# Patient Record
Sex: MALE | Race: BLACK OR AFRICAN AMERICAN | NOT HISPANIC OR LATINO | Employment: OTHER | URBAN - METROPOLITAN AREA
[De-identification: names, ages, dates, MRNs, and addresses within clinical notes are randomized per-mention and may not be internally consistent; named-entity substitution may affect disease eponyms.]

---

## 2019-11-26 ENCOUNTER — HOSPITAL ENCOUNTER (OUTPATIENT)
Dept: RADIOLOGY | Facility: MEDICAL CENTER | Age: 79
End: 2019-11-26
Attending: INTERNAL MEDICINE
Payer: COMMERCIAL

## 2019-11-26 DIAGNOSIS — D72.829 LEUKOCYTOSIS, UNSPECIFIED TYPE: ICD-10-CM

## 2019-11-26 DIAGNOSIS — M54.6 PAIN IN THORACIC SPINE: ICD-10-CM

## 2019-11-26 DIAGNOSIS — M54.50 LOW BACK PAIN, UNSPECIFIED BACK PAIN LATERALITY, UNSPECIFIED CHRONICITY, UNSPECIFIED WHETHER SCIATICA PRESENT: ICD-10-CM

## 2019-11-26 PROCEDURE — 72070 X-RAY EXAM THORAC SPINE 2VWS: CPT

## 2019-11-26 PROCEDURE — 76700 US EXAM ABDOM COMPLETE: CPT

## 2019-11-26 PROCEDURE — 72100 X-RAY EXAM L-S SPINE 2/3 VWS: CPT

## 2019-12-01 DIAGNOSIS — J32.9 SINUSITIS, UNSPECIFIED CHRONICITY, UNSPECIFIED LOCATION: ICD-10-CM

## 2019-12-01 RX ORDER — LEVOFLOXACIN 750 MG/1
750 TABLET, FILM COATED ORAL 2 TIMES DAILY
Qty: 14 TAB | Refills: 0 | Status: SHIPPED | OUTPATIENT
Start: 2019-12-01 | End: 2019-12-08

## 2021-01-15 DIAGNOSIS — Z23 NEED FOR VACCINATION: ICD-10-CM

## 2021-06-20 ENCOUNTER — HOSPITAL ENCOUNTER (INPATIENT)
Facility: MEDICAL CENTER | Age: 81
LOS: 4 days | DRG: 871 | End: 2021-06-25
Attending: EMERGENCY MEDICINE | Admitting: STUDENT IN AN ORGANIZED HEALTH CARE EDUCATION/TRAINING PROGRAM
Payer: MEDICARE

## 2021-06-20 ENCOUNTER — APPOINTMENT (OUTPATIENT)
Dept: RADIOLOGY | Facility: MEDICAL CENTER | Age: 81
DRG: 871 | End: 2021-06-20
Attending: EMERGENCY MEDICINE
Payer: MEDICARE

## 2021-06-20 DIAGNOSIS — B95.8 BACTEREMIA DUE TO STAPHYLOCOCCUS: ICD-10-CM

## 2021-06-20 DIAGNOSIS — R78.81 BACTEREMIA DUE TO STAPHYLOCOCCUS: ICD-10-CM

## 2021-06-20 DIAGNOSIS — I48.91 ATRIAL FIBRILLATION WITH RVR (HCC): ICD-10-CM

## 2021-06-20 DIAGNOSIS — A41.9 SEPSIS, DUE TO UNSPECIFIED ORGANISM, UNSPECIFIED WHETHER ACUTE ORGAN DYSFUNCTION PRESENT (HCC): ICD-10-CM

## 2021-06-20 DIAGNOSIS — E66.01 MORBIDLY OBESE (HCC): ICD-10-CM

## 2021-06-20 DIAGNOSIS — L03.116 CELLULITIS OF LEFT LOWER EXTREMITY: ICD-10-CM

## 2021-06-20 LAB
ALBUMIN SERPL BCP-MCNC: 3.7 G/DL (ref 3.2–4.9)
ALBUMIN/GLOB SERPL: 1 G/DL
ALP SERPL-CCNC: 89 U/L (ref 30–99)
ALT SERPL-CCNC: 19 U/L (ref 2–50)
ANION GAP SERPL CALC-SCNC: 10 MMOL/L (ref 7–16)
AST SERPL-CCNC: 24 U/L (ref 12–45)
BILIRUB SERPL-MCNC: 0.3 MG/DL (ref 0.1–1.5)
BUN SERPL-MCNC: 28 MG/DL (ref 8–22)
CALCIUM SERPL-MCNC: 9.9 MG/DL (ref 8.5–10.5)
CHLORIDE SERPL-SCNC: 105 MMOL/L (ref 96–112)
CO2 SERPL-SCNC: 23 MMOL/L (ref 20–33)
CREAT SERPL-MCNC: 1.85 MG/DL (ref 0.5–1.4)
EKG IMPRESSION: NORMAL
GLOBULIN SER CALC-MCNC: 3.6 G/DL (ref 1.9–3.5)
GLUCOSE SERPL-MCNC: 133 MG/DL (ref 65–99)
LACTATE BLD-SCNC: 3.4 MMOL/L (ref 0.5–2)
POTASSIUM SERPL-SCNC: 5.4 MMOL/L (ref 3.6–5.5)
PROT SERPL-MCNC: 7.3 G/DL (ref 6–8.2)
SODIUM SERPL-SCNC: 138 MMOL/L (ref 135–145)
TROPONIN T SERPL-MCNC: 89 NG/L (ref 6–19)

## 2021-06-20 PROCEDURE — 87040 BLOOD CULTURE FOR BACTERIA: CPT

## 2021-06-20 PROCEDURE — 0241U HCHG SARS-COV-2 COVID-19 NFCT DS RESP RNA 4 TRGT MIC: CPT

## 2021-06-20 PROCEDURE — 700105 HCHG RX REV CODE 258: Performed by: EMERGENCY MEDICINE

## 2021-06-20 PROCEDURE — 96365 THER/PROPH/DIAG IV INF INIT: CPT

## 2021-06-20 PROCEDURE — 83735 ASSAY OF MAGNESIUM: CPT

## 2021-06-20 PROCEDURE — 85520 HEPARIN ASSAY: CPT

## 2021-06-20 PROCEDURE — 87077 CULTURE AEROBIC IDENTIFY: CPT | Mod: 91

## 2021-06-20 PROCEDURE — 93005 ELECTROCARDIOGRAM TRACING: CPT | Performed by: EMERGENCY MEDICINE

## 2021-06-20 PROCEDURE — 84145 PROCALCITONIN (PCT): CPT

## 2021-06-20 PROCEDURE — 93005 ELECTROCARDIOGRAM TRACING: CPT

## 2021-06-20 PROCEDURE — 84439 ASSAY OF FREE THYROXINE: CPT

## 2021-06-20 PROCEDURE — 85379 FIBRIN DEGRADATION QUANT: CPT

## 2021-06-20 PROCEDURE — 71045 X-RAY EXAM CHEST 1 VIEW: CPT

## 2021-06-20 PROCEDURE — 85610 PROTHROMBIN TIME: CPT

## 2021-06-20 PROCEDURE — 84443 ASSAY THYROID STIM HORMONE: CPT

## 2021-06-20 PROCEDURE — 83880 ASSAY OF NATRIURETIC PEPTIDE: CPT

## 2021-06-20 PROCEDURE — 85730 THROMBOPLASTIN TIME PARTIAL: CPT

## 2021-06-20 PROCEDURE — 96375 TX/PRO/DX INJ NEW DRUG ADDON: CPT

## 2021-06-20 PROCEDURE — 85027 COMPLETE CBC AUTOMATED: CPT

## 2021-06-20 PROCEDURE — 85007 BL SMEAR W/DIFF WBC COUNT: CPT

## 2021-06-20 PROCEDURE — 99285 EMERGENCY DEPT VISIT HI MDM: CPT

## 2021-06-20 PROCEDURE — 87186 SC STD MICRODIL/AGAR DIL: CPT | Mod: 91

## 2021-06-20 PROCEDURE — 700111 HCHG RX REV CODE 636 W/ 250 OVERRIDE (IP): Performed by: EMERGENCY MEDICINE

## 2021-06-20 PROCEDURE — 84484 ASSAY OF TROPONIN QUANT: CPT

## 2021-06-20 PROCEDURE — 80053 COMPREHEN METABOLIC PANEL: CPT

## 2021-06-20 PROCEDURE — 83605 ASSAY OF LACTIC ACID: CPT

## 2021-06-20 RX ORDER — DILTIAZEM HYDROCHLORIDE 5 MG/ML
20 INJECTION INTRAVENOUS ONCE
Status: COMPLETED | OUTPATIENT
Start: 2021-06-20 | End: 2021-06-20

## 2021-06-20 RX ORDER — SODIUM CHLORIDE 9 MG/ML
1000 INJECTION, SOLUTION INTRAVENOUS ONCE
Status: COMPLETED | OUTPATIENT
Start: 2021-06-20 | End: 2021-06-21

## 2021-06-20 RX ADMIN — DILTIAZEM HYDROCHLORIDE 20 MG: 5 INJECTION INTRAVENOUS at 23:22

## 2021-06-20 RX ADMIN — SODIUM CHLORIDE 1000 ML: 9 INJECTION, SOLUTION INTRAVENOUS at 23:22

## 2021-06-21 ENCOUNTER — APPOINTMENT (OUTPATIENT)
Dept: RADIOLOGY | Facility: MEDICAL CENTER | Age: 81
DRG: 871 | End: 2021-06-21
Attending: STUDENT IN AN ORGANIZED HEALTH CARE EDUCATION/TRAINING PROGRAM
Payer: MEDICARE

## 2021-06-21 PROBLEM — E66.01 MORBIDLY OBESE (HCC): Status: ACTIVE | Noted: 2021-06-21

## 2021-06-21 PROBLEM — N17.9 ACUTE KIDNEY INJURY (HCC): Status: ACTIVE | Noted: 2021-06-21
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
## 2021-06-21 PROBLEM — A41.9 SEPSIS (HCC): Status: ACTIVE | Noted: 2021-06-21

## 2021-06-21 PROBLEM — R79.89 ELEVATED TROPONIN: Status: ACTIVE | Noted: 2021-06-21

## 2021-06-21 PROBLEM — A41.9 SEPSIS (HCC): Chronic | Status: ACTIVE | Noted: 2021-06-21

## 2021-06-21 PROBLEM — I48.91 A-FIB (HCC): Status: ACTIVE | Noted: 2021-06-21

## 2021-06-21 PROBLEM — J96.00 ACUTE RESPIRATORY FAILURE (HCC): Status: ACTIVE | Noted: 2021-06-21

## 2021-06-21 PROBLEM — I71.019 CHRONIC THORACIC AORTIC DISSECTION (HCC): Status: ACTIVE | Noted: 2021-06-21

## 2021-06-21 PROBLEM — L03.90 CELLULITIS: Status: ACTIVE | Noted: 2021-06-21

## 2021-06-21 PROBLEM — I21.A1 TYPE 2 MYOCARDIAL INFARCTION (HCC): Status: ACTIVE | Noted: 2021-06-21

## 2021-06-21 LAB
ANISOCYTOSIS BLD QL SMEAR: ABNORMAL
APPEARANCE UR: CLEAR
APTT PPP: 25.7 SEC (ref 24.7–36)
BACTERIA #/AREA URNS HPF: NEGATIVE /HPF
BASOPHILS # BLD AUTO: 0 % (ref 0–1.8)
BASOPHILS # BLD: 0 K/UL (ref 0–0.12)
BILIRUB UR QL STRIP.AUTO: NEGATIVE
COLOR UR: YELLOW
D DIMER PPP IA.FEU-MCNC: 4.95 UG/ML (FEU) (ref 0–0.5)
EKG IMPRESSION: NORMAL
EOSINOPHIL # BLD AUTO: 0 K/UL (ref 0–0.51)
EOSINOPHIL NFR BLD: 0 % (ref 0–6.9)
EPI CELLS #/AREA URNS HPF: NEGATIVE /HPF
ERYTHROCYTE [DISTWIDTH] IN BLOOD BY AUTOMATED COUNT: 50.5 FL (ref 35.9–50)
FLUAV RNA SPEC QL NAA+PROBE: NEGATIVE
FLUBV RNA SPEC QL NAA+PROBE: NEGATIVE
GLUCOSE UR STRIP.AUTO-MCNC: NEGATIVE MG/DL
HCT VFR BLD AUTO: 44.4 % (ref 42–52)
HGB BLD-MCNC: 14 G/DL (ref 14–18)
HYALINE CASTS #/AREA URNS LPF: ABNORMAL /LPF
INR PPP: 1.22 (ref 0.87–1.13)
KETONES UR STRIP.AUTO-MCNC: NEGATIVE MG/DL
LACTATE BLD-SCNC: 1.5 MMOL/L (ref 0.5–2)
LACTATE BLD-SCNC: 1.8 MMOL/L (ref 0.5–2)
LACTATE BLD-SCNC: 2.5 MMOL/L (ref 0.5–2)
LEUKOCYTE ESTERASE UR QL STRIP.AUTO: NEGATIVE
LYMPHOCYTES # BLD AUTO: 1.66 K/UL (ref 1–4.8)
LYMPHOCYTES NFR BLD: 6.3 % (ref 22–41)
MACROCYTES BLD QL SMEAR: ABNORMAL
MAGNESIUM SERPL-MCNC: 1.6 MG/DL (ref 1.5–2.5)
MANUAL DIFF BLD: NORMAL
MCH RBC QN AUTO: 29 PG (ref 27–33)
MCHC RBC AUTO-ENTMCNC: 31.5 G/DL (ref 33.7–35.3)
MCV RBC AUTO: 91.9 FL (ref 81.4–97.8)
MICRO URNS: ABNORMAL
MONOCYTES # BLD AUTO: 1.84 K/UL (ref 0–0.85)
MONOCYTES NFR BLD AUTO: 7 % (ref 0–13.4)
MORPHOLOGY BLD-IMP: NORMAL
NEUTROPHILS # BLD AUTO: 22.8 K/UL (ref 1.82–7.42)
NEUTROPHILS NFR BLD: 86.7 % (ref 44–72)
NITRITE UR QL STRIP.AUTO: NEGATIVE
NRBC # BLD AUTO: 0 K/UL
NRBC BLD-RTO: 0 /100 WBC
NT-PROBNP SERPL IA-MCNC: 253 PG/ML (ref 0–125)
PH UR STRIP.AUTO: 5 [PH] (ref 5–8)
PLATELET # BLD AUTO: 148 K/UL (ref 164–446)
PLATELET BLD QL SMEAR: NORMAL
PMV BLD AUTO: 9.8 FL (ref 9–12.9)
PROCALCITONIN SERPL-MCNC: 0.15 NG/ML
PROT UR QL STRIP: 100 MG/DL
PROTHROMBIN TIME: 15.1 SEC (ref 12–14.6)
RBC # BLD AUTO: 4.83 M/UL (ref 4.7–6.1)
RBC # URNS HPF: ABNORMAL /HPF
RBC BLD AUTO: PRESENT
RBC UR QL AUTO: ABNORMAL
RSV RNA SPEC QL NAA+PROBE: NEGATIVE
SARS-COV-2 RNA RESP QL NAA+PROBE: NOTDETECTED
SP GR UR STRIP.AUTO: 1.02
SPECIMEN SOURCE: NORMAL
T4 FREE SERPL-MCNC: 1.17 NG/DL (ref 0.93–1.7)
TROPONIN T SERPL-MCNC: 108 NG/L (ref 6–19)
TROPONIN T SERPL-MCNC: 117 NG/L (ref 6–19)
TROPONIN T SERPL-MCNC: 97 NG/L (ref 6–19)
TSH SERPL DL<=0.005 MIU/L-ACNC: 1.75 UIU/ML (ref 0.38–5.33)
UFH PPP CHRO-ACNC: <0.1 IU/ML
UROBILINOGEN UR STRIP.AUTO-MCNC: 0.2 MG/DL
WBC # BLD AUTO: 26.3 K/UL (ref 4.8–10.8)
WBC #/AREA URNS HPF: ABNORMAL /HPF

## 2021-06-21 PROCEDURE — 36415 COLL VENOUS BLD VENIPUNCTURE: CPT

## 2021-06-21 PROCEDURE — 93005 ELECTROCARDIOGRAM TRACING: CPT | Performed by: STUDENT IN AN ORGANIZED HEALTH CARE EDUCATION/TRAINING PROGRAM

## 2021-06-21 PROCEDURE — 700111 HCHG RX REV CODE 636 W/ 250 OVERRIDE (IP): Performed by: EMERGENCY MEDICINE

## 2021-06-21 PROCEDURE — A9270 NON-COVERED ITEM OR SERVICE: HCPCS | Performed by: STUDENT IN AN ORGANIZED HEALTH CARE EDUCATION/TRAINING PROGRAM

## 2021-06-21 PROCEDURE — 700105 HCHG RX REV CODE 258: Performed by: STUDENT IN AN ORGANIZED HEALTH CARE EDUCATION/TRAINING PROGRAM

## 2021-06-21 PROCEDURE — 99223 1ST HOSP IP/OBS HIGH 75: CPT | Performed by: STUDENT IN AN ORGANIZED HEALTH CARE EDUCATION/TRAINING PROGRAM

## 2021-06-21 PROCEDURE — 700102 HCHG RX REV CODE 250 W/ 637 OVERRIDE(OP): Performed by: STUDENT IN AN ORGANIZED HEALTH CARE EDUCATION/TRAINING PROGRAM

## 2021-06-21 PROCEDURE — 99231 SBSQ HOSP IP/OBS SF/LOW 25: CPT | Performed by: SURGERY

## 2021-06-21 PROCEDURE — 87040 BLOOD CULTURE FOR BACTERIA: CPT

## 2021-06-21 PROCEDURE — 307059 PAD,EAR PROTECTOR: Performed by: INTERNAL MEDICINE

## 2021-06-21 PROCEDURE — 83605 ASSAY OF LACTIC ACID: CPT

## 2021-06-21 PROCEDURE — 700105 HCHG RX REV CODE 258: Performed by: EMERGENCY MEDICINE

## 2021-06-21 PROCEDURE — 81001 URINALYSIS AUTO W/SCOPE: CPT

## 2021-06-21 PROCEDURE — 71275 CT ANGIOGRAPHY CHEST: CPT | Mod: ME

## 2021-06-21 PROCEDURE — 99223 1ST HOSP IP/OBS HIGH 75: CPT | Performed by: INTERNAL MEDICINE

## 2021-06-21 PROCEDURE — 84484 ASSAY OF TROPONIN QUANT: CPT

## 2021-06-21 PROCEDURE — 700101 HCHG RX REV CODE 250: Performed by: STUDENT IN AN ORGANIZED HEALTH CARE EDUCATION/TRAINING PROGRAM

## 2021-06-21 PROCEDURE — 770020 HCHG ROOM/CARE - TELE (206)

## 2021-06-21 PROCEDURE — 94640 AIRWAY INHALATION TREATMENT: CPT

## 2021-06-21 PROCEDURE — 700111 HCHG RX REV CODE 636 W/ 250 OVERRIDE (IP): Performed by: STUDENT IN AN ORGANIZED HEALTH CARE EDUCATION/TRAINING PROGRAM

## 2021-06-21 PROCEDURE — 700117 HCHG RX CONTRAST REV CODE 255: Performed by: STUDENT IN AN ORGANIZED HEALTH CARE EDUCATION/TRAINING PROGRAM

## 2021-06-21 RX ORDER — ONDANSETRON 2 MG/ML
4 INJECTION INTRAMUSCULAR; INTRAVENOUS EVERY 4 HOURS PRN
Status: DISCONTINUED | OUTPATIENT
Start: 2021-06-21 | End: 2021-06-25 | Stop reason: HOSPADM

## 2021-06-21 RX ORDER — BISACODYL 10 MG
10 SUPPOSITORY, RECTAL RECTAL
Status: DISCONTINUED | OUTPATIENT
Start: 2021-06-21 | End: 2021-06-25 | Stop reason: HOSPADM

## 2021-06-21 RX ORDER — ALLOPURINOL 100 MG/1
100 TABLET ORAL EVERY MORNING
COMMUNITY

## 2021-06-21 RX ORDER — MAGNESIUM SULFATE 1 G/100ML
1 INJECTION INTRAVENOUS ONCE
Status: COMPLETED | OUTPATIENT
Start: 2021-06-21 | End: 2021-06-21

## 2021-06-21 RX ORDER — POLYETHYLENE GLYCOL 3350 17 G/17G
1 POWDER, FOR SOLUTION ORAL
Status: DISCONTINUED | OUTPATIENT
Start: 2021-06-21 | End: 2021-06-25 | Stop reason: HOSPADM

## 2021-06-21 RX ORDER — SODIUM CHLORIDE, SODIUM LACTATE, POTASSIUM CHLORIDE, CALCIUM CHLORIDE 600; 310; 30; 20 MG/100ML; MG/100ML; MG/100ML; MG/100ML
INJECTION, SOLUTION INTRAVENOUS CONTINUOUS
Status: DISCONTINUED | OUTPATIENT
Start: 2021-06-21 | End: 2021-06-21

## 2021-06-21 RX ORDER — AMOXICILLIN 250 MG
2 CAPSULE ORAL 2 TIMES DAILY
Status: DISCONTINUED | OUTPATIENT
Start: 2021-06-21 | End: 2021-06-25 | Stop reason: HOSPADM

## 2021-06-21 RX ORDER — CARVEDILOL 6.25 MG/1
6.25 TABLET ORAL 2 TIMES DAILY WITH MEALS
Status: DISCONTINUED | OUTPATIENT
Start: 2021-06-21 | End: 2021-06-25 | Stop reason: HOSPADM

## 2021-06-21 RX ORDER — LISINOPRIL 20 MG/1
20 TABLET ORAL 2 TIMES DAILY
COMMUNITY

## 2021-06-21 RX ORDER — ONDANSETRON 4 MG/1
4 TABLET, ORALLY DISINTEGRATING ORAL EVERY 4 HOURS PRN
Status: DISCONTINUED | OUTPATIENT
Start: 2021-06-21 | End: 2021-06-25 | Stop reason: HOSPADM

## 2021-06-21 RX ORDER — ENALAPRILAT 1.25 MG/ML
1.25 INJECTION INTRAVENOUS EVERY 6 HOURS PRN
Status: DISCONTINUED | OUTPATIENT
Start: 2021-06-21 | End: 2021-06-25 | Stop reason: HOSPADM

## 2021-06-21 RX ORDER — ALLOPURINOL 300 MG/1
300 TABLET ORAL EVERY MORNING
COMMUNITY

## 2021-06-21 RX ORDER — ACETAMINOPHEN 325 MG/1
650 TABLET ORAL EVERY 6 HOURS PRN
Status: DISCONTINUED | OUTPATIENT
Start: 2021-06-21 | End: 2021-06-25 | Stop reason: HOSPADM

## 2021-06-21 RX ORDER — METOPROLOL SUCCINATE 25 MG/1
25 TABLET, EXTENDED RELEASE ORAL DAILY
Status: ON HOLD | COMMUNITY
End: 2021-06-25

## 2021-06-21 RX ORDER — METOPROLOL TARTRATE 1 MG/ML
5 INJECTION, SOLUTION INTRAVENOUS
Status: DISCONTINUED | OUTPATIENT
Start: 2021-06-21 | End: 2021-06-25 | Stop reason: HOSPADM

## 2021-06-21 RX ORDER — CEFAZOLIN SODIUM 2 G/100ML
2 INJECTION, SOLUTION INTRAVENOUS EVERY 8 HOURS
Status: DISCONTINUED | OUTPATIENT
Start: 2021-06-21 | End: 2021-06-25 | Stop reason: HOSPADM

## 2021-06-21 RX ORDER — METOPROLOL SUCCINATE 50 MG/1
50 TABLET, EXTENDED RELEASE ORAL
Status: DISCONTINUED | OUTPATIENT
Start: 2021-06-21 | End: 2021-06-21

## 2021-06-21 RX ORDER — IPRATROPIUM BROMIDE AND ALBUTEROL SULFATE 2.5; .5 MG/3ML; MG/3ML
3 SOLUTION RESPIRATORY (INHALATION)
Status: DISCONTINUED | OUTPATIENT
Start: 2021-06-21 | End: 2021-06-22

## 2021-06-21 RX ORDER — SODIUM CHLORIDE, SODIUM LACTATE, POTASSIUM CHLORIDE, AND CALCIUM CHLORIDE .6; .31; .03; .02 G/100ML; G/100ML; G/100ML; G/100ML
2000 INJECTION, SOLUTION INTRAVENOUS ONCE
Status: COMPLETED | OUTPATIENT
Start: 2021-06-21 | End: 2021-06-21

## 2021-06-21 RX ORDER — LABETALOL HYDROCHLORIDE 5 MG/ML
10 INJECTION, SOLUTION INTRAVENOUS EVERY 4 HOURS PRN
Status: DISCONTINUED | OUTPATIENT
Start: 2021-06-21 | End: 2021-06-25 | Stop reason: HOSPADM

## 2021-06-21 RX ADMIN — MAGNESIUM SULFATE 1 G: 1 INJECTION INTRAVENOUS at 05:41

## 2021-06-21 RX ADMIN — CEFAZOLIN SODIUM 2 G: 2 INJECTION, SOLUTION INTRAVENOUS at 14:11

## 2021-06-21 RX ADMIN — ASPIRIN 81 MG: 81 TABLET, COATED ORAL at 05:42

## 2021-06-21 RX ADMIN — IOHEXOL 100 ML: 350 INJECTION, SOLUTION INTRAVENOUS at 11:48

## 2021-06-21 RX ADMIN — SODIUM CHLORIDE, POTASSIUM CHLORIDE, SODIUM LACTATE AND CALCIUM CHLORIDE 2000 ML: 600; 310; 30; 20 INJECTION, SOLUTION INTRAVENOUS at 08:33

## 2021-06-21 RX ADMIN — IPRATROPIUM BROMIDE AND ALBUTEROL SULFATE 3 ML: .5; 2.5 SOLUTION RESPIRATORY (INHALATION) at 15:08

## 2021-06-21 RX ADMIN — CEFAZOLIN SODIUM 2 G: 2 INJECTION, SOLUTION INTRAVENOUS at 21:50

## 2021-06-21 RX ADMIN — CARVEDILOL 6.25 MG: 6.25 TABLET, FILM COATED ORAL at 14:11

## 2021-06-21 RX ADMIN — SODIUM CHLORIDE 3 G: 900 INJECTION INTRAVENOUS at 05:41

## 2021-06-21 RX ADMIN — METOPROLOL SUCCINATE 50 MG: 50 TABLET, EXTENDED RELEASE ORAL at 05:42

## 2021-06-21 RX ADMIN — ENOXAPARIN SODIUM 150 MG: 150 INJECTION SUBCUTANEOUS at 14:11

## 2021-06-21 RX ADMIN — ENOXAPARIN SODIUM 150 MG: 150 INJECTION SUBCUTANEOUS at 05:42

## 2021-06-21 RX ADMIN — SODIUM CHLORIDE 3 G: 900 INJECTION INTRAVENOUS at 00:54

## 2021-06-21 ASSESSMENT — ENCOUNTER SYMPTOMS: FALLS: 1

## 2021-06-21 ASSESSMENT — COGNITIVE AND FUNCTIONAL STATUS - GENERAL
HELP NEEDED FOR BATHING: A LOT
SUGGESTED CMS G CODE MODIFIER MOBILITY: CK
SUGGESTED CMS G CODE MODIFIER DAILY ACTIVITY: CK
MOVING FROM LYING ON BACK TO SITTING ON SIDE OF FLAT BED: A LITTLE
PERSONAL GROOMING: A LITTLE
DRESSING REGULAR LOWER BODY CLOTHING: A LOT
MOBILITY SCORE: 17
MOVING TO AND FROM BED TO CHAIR: A LITTLE
WALKING IN HOSPITAL ROOM: A LOT
STANDING UP FROM CHAIR USING ARMS: A LITTLE
CLIMB 3 TO 5 STEPS WITH RAILING: A LOT
DAILY ACTIVITIY SCORE: 17
DRESSING REGULAR UPPER BODY CLOTHING: A LITTLE
TOILETING: A LITTLE

## 2021-06-21 ASSESSMENT — FIBROSIS 4 INDEX: FIB4 SCORE: 2.9

## 2021-06-21 NOTE — ASSESSMENT & PLAN NOTE
New onset A. fib with RVR; now Afib without RVR.  Admitted with telemetry  Start carvedilol 6.25 mg twice daily  As needed enalaprilat 1.25  Continuer enoxaparin 150  Consider transition to rivaroxaban 20 pending improvement of ANGIE function  Cardiology consulted, appreciate recc's

## 2021-06-21 NOTE — ED PROVIDER NOTES
ER Provider Note     Scribed for Tyree Phan M.D. by Amanda Berger. 6/20/2021, 11:02 PM.    Primary Care Provider: Pcp Pt States None  Means of Arrival: Gabriela    History obtained from: Patient  History limited by: None     CHIEF COMPLAINT  Chief Complaint   Patient presents with    Fall     pt had fall going from kitchen to BR, landed forward on stomach onto tile floor. -head injury, -neck pain, pt states he was using walker and walker got too ahead of him.     Atrial Fibrillation     upon fire arrival pt was in afib rvr, fire gave 324 ASA. ems stated pt still in afib rvr 120-150 w/ PVCs. pt has no hx of afib or heart issues.           SERGIO Lewis is a 80 y.o. male who presents to the Emergency Department with a fall that happened earlier today.  The patient states that he was walking to the bathroom when his walker ended up too far out in front of him. When he tried to grab his walker, he fell forward. He denies chest pain, dizziness, neck pain, vision change, nausea, vomiting, foot pain or diarrhea. The patient's toes started bleeding after his fall. He has no history of atrial fibrillation and currently feels no palpitations. The patient adds that he is taking Lisinopril for his hypertension and is currently seeing . EMS noted a fever of 101.2 °F and he was given Tylenol. He has had a stress test in the past and the results were normal.     REVIEW OF SYSTEMS  Pertinent positives include atrial fibrillation and fall. Pertinent negatives include no chest pain, dizziness, neck pain, vision change, nausea, vomiting, foot pain or diarrhea.  All other systems reviewed and negative.     PAST MEDICAL HISTORY  Hypertension     SURGICAL HISTORY  patient denies any surgical history    SOCIAL HISTORY  Social History     Tobacco Use    Smoking status: None noted    Substance Use Topics    Alcohol use: None noted     Drug use: None noted       Social History     Substance and Sexual Activity   Drug Use  "None noted        FAMILY HISTORY  None noted     CURRENT MEDICATIONS  Home Medications       Reviewed by Frank Wilson Goes Out, PhT (Pharmacy Tech) on 06/21/21 at 0043  Med List Status: Complete     Medication Last Dose Status   allopurinol (ZYLOPRIM) 100 MG Tab 6/20/2021 Active   allopurinol (ZYLOPRIM) 300 MG Tab 6/20/2021 Active   lisinopril (PRINIVIL) 20 MG Tab 6/20/2021 Active   metoprolol SR (TOPROL XL) 25 MG TABLET SR 24 HR About 2 weeks ago Active                    ALLERGIES  No Known Allergies    PHYSICAL EXAM  VITAL SIGNS: /72   Pulse (!) 140   Temp 36.9 °C (98.4 °F) (Temporal)   Resp 18   Ht 1.803 m (5' 11\")   Wt (!) 154 kg (339 lb 8.1 oz)   SpO2 95%   BMI 47.35 kg/m²      Constitutional: Alert in no apparent distress.  HENT: No signs of trauma, Bilateral external ears normal, Nose normal.   Eyes: Pupils are equal and reactive, Conjunctiva normal, Non-icteric.   Neck: Normal range of motion, No tenderness, Supple, No stridor.   Lymphatic: No lymphadenopathy noted.   Cardiovascular: Tachycardic rate and irregularly irregular rhythm, no palpable thrill  Thorax & Lungs: No respiratory distress,  No chest tenderness.   Abdomen: Bowel sounds normal, Soft, No tenderness, No masses, No pulsatile masses. No peritoneal signs.  Skin: Warm, Dry, No erythema, No rash.   Back: No bony tenderness, No CVA tenderness.   Extremities: Abrasion to plantar aspect of right toe, Small abrasion to left knee and old surgical scars, Bilateral pedal edema left more than right, erythema to medial aspect of left ankle and warm to touch, Intact distal pulses, No tenderness, No cyanosis.  Musculoskeletal: Good range of motion in all major joints. No tenderness to palpation or major deformities noted.   Neurologic: Alert , Normal motor function, Normal sensory function, No focal deficits noted.   Psychiatric: Affect normal, Judgment normal, Mood normal.         DIAGNOSTIC STUDIES / PROCEDURES    EKG " Interpretation:  Interpreted by me    12 Lead EKG interpreted by me to show:   Irregularly irregular rhythm  Rate 148  Axis: Normal  Intervals: Normal  Normal T waves  Normal ST segments  My impression of this EKG: A fib and RVR        LABS  Labs Reviewed   CBC WITH DIFFERENTIAL - Abnormal; Notable for the following components:       Result Value    WBC 26.3 (*)     MCHC 31.5 (*)     RDW 50.5 (*)     Platelet Count 148 (*)     Neutrophils-Polys 86.70 (*)     Lymphocytes 6.30 (*)     Neutrophils (Absolute) 22.80 (*)     Monos (Absolute) 1.84 (*)     All other components within normal limits   COMP METABOLIC PANEL - Abnormal; Notable for the following components:    Glucose 133 (*)     Bun 28 (*)     Creatinine 1.85 (*)     Globulin 3.6 (*)     All other components within normal limits   TROPONIN - Abnormal; Notable for the following components:    Troponin T 89 (*)     All other components within normal limits   LACTIC ACID - Abnormal; Notable for the following components:    Lactic Acid 3.4 (*)     All other components within normal limits   D-DIMER - Abnormal; Notable for the following components:    D-Dimer Screen 4.95 (*)     All other components within normal limits   ESTIMATED GFR - Abnormal; Notable for the following components:    GFR If  43 (*)     GFR If Non  35 (*)     All other components within normal limits   PROBRAIN NATRIURETIC PEPTIDE, NT - Abnormal; Notable for the following components:    NT-proBNP 253 (*)     All other components within normal limits    Narrative:     If not done within the last 4 hours  Indicate which anticoagulants the patient is on:->UNKNOWN   PROTHROMBIN TIME - Abnormal; Notable for the following components:    PT 15.1 (*)     INR 1.22 (*)     All other components within normal limits    Narrative:     Indicate which anticoagulants the patient is on:->UNKNOWN   BLOOD CULTURE    Narrative:     Per Hospital Policy: Only change Specimen Src: to  "\"Line\" if  specified by physician order.   BLOOD CULTURE    Narrative:     Per Hospital Policy: Only change Specimen Src: to \"Line\" if  specified by physician order.   COV-2, FLU A/B, AND RSV BY PCR   PERIPHERAL SMEAR REVIEW   PLATELET ESTIMATE   MORPHOLOGY   DIFFERENTIAL MANUAL   MAGNESIUM    Narrative:     If not done within the last 4 hours  Indicate which anticoagulants the patient is on:->UNKNOWN   TSH    Narrative:     If not done within the last 4 hours  Indicate which anticoagulants the patient is on:->UNKNOWN   FREE THYROXINE    Narrative:     If not done within the last 4 hours  Indicate which anticoagulants the patient is on:->UNKNOWN   TROPONIN    Narrative:     Indicate which anticoagulants the patient is on:->UNKNOWN   PROCALCITONIN    Narrative:     If not done within the last 4 hours  Indicate which anticoagulants the patient is on:->UNKNOWN   APTT    Narrative:     Indicate which anticoagulants the patient is on:->UNKNOWN   HEPARIN XA (UNFRACTIONATED)    Narrative:     Indicate which anticoagulants the patient is on:->UNKNOWN   URINALYSIS,CULTURE IF INDICATED   LACTIC ACID   BLOOD CULTURE   BLOOD CULTURE   URINALYSIS     All labs reviewed by me.    RADIOLOGY  DX-CHEST-PORTABLE (1 VIEW)   Final Result      No acute cardiopulmonary abnormality identified.      EC-ECHOCARDIOGRAM COMPLETE W/O CONT    (Results Pending)   CT-CTA CHEST PULMONARY ARTERY W/ RECONS    (Results Pending)      The radiologist's interpretation of all radiological studies have been reviewed by me.    COURSE & MEDICAL DECISION MAKING  Pertinent Labs & Imaging studies reviewed. (See chart for details)    This is a 80 y.o. male that presents with appears to be infection in his left lower extremity as well as atrial fibrillation ventricular rate.  We will treat this aggressively.  Will evaluate the patient for sepsis as well as infection and acute coronary syndrome.  We will reassess after this..     11:02 PM - Patient seen and " examined at bedside. Ordered DX-chest-portable, Urinalysis culture, Blood culture x2, lactic acid, CBC with diff, CMP, troponin, NS infusion 1000 mL and EKG.  Patient will be medicated with Cardizem 20 mg for his symptoms.     11:23PM- Ordered D-dimer.     11:53 PM I discussed the patient's case and the above findings with Dr. Huffman (Hospitalist) who agreed to accept the patient.     12:17AM- Paged hospitalist.     12:34 AM I discussed the patient's case and the above findings with Dr. Woodard (Hospitalist) who agreed to evaluate the patient.       HYDRATION: Based on the patient's presentation of Dehydration the patient was given IV fluids. IV Hydration was used because oral hydration was not adequate alone. Upon recheck following hydration, the patient was improved.      After fluids as well as medication the patient's atrial fibrillation is improved.  Does have an elevated lactic acid.  He does have an elevated white count of 26.  Antibiotics will be given.  Fluids were given.  Troponin is elevated at 89.  His D-dimer is significantly elevated.  At this time a CT scan of the chest will be performed to be followed up by the admitting team.  We will admit the patient in guarded condition.  CRITICAL CARE  The very real possibilty of a deterioration of this patient's condition required the highest level of my preparedness for sudden, emergent intervention.  I provided critical care services, which included medication orders, frequent reevaluations of the patient's condition and response to treatment, ordering and reviewing test results, and discussing the case with various consultants.  The critical care time associated with the care of the patient was 35 minutes. Review chart for interventions. This time is exclusive of any other billable procedures.       DISPOSITION:  Patient will be hospitalized by Dr. Kiran in serious condition.      FINAL IMPRESSION  1. Atrial fibrillation with RVR (HCC)    2. Cellulitis of  left lower extremity    3. Sepsis, due to unspecified organism, unspecified whether acute organ dysfunction present (HCC)          Amanda CERDA (Scribe), am scribing for, and in the presence of, Tyree Phan M.D..    Electronically signed by: Amanda Berger (Scribdoreen), 6/20/2021    Tyree CERDA M.D. personally performed the services described in this documentation, as scribed by Amanda Berger in my presence, and it is both accurate and complete.     The note accurately reflects work and decisions made by me.  Tyree Phan M.D.  6/21/2021  1:24 AM

## 2021-06-21 NOTE — ED NOTES
Henrique from Lab called with critical result of troponin 108 at 0159. Critical lab result read back to Henrique.   Dr. Kiran notified of critical lab result at 0208.  Critical lab result read back by Dr. Kiran.

## 2021-06-21 NOTE — PROGRESS NOTES
Daily Progress Note:     Date of Service: 6/21/2021  Primary Team: [facility] Team   Attending: Aiden Weston D.O   Senior Resident: Dr. Zachery Adkins MD  Intern: Dr. ERIN Rapp MD  Contact:  [phone]    Chief Complaint:   Ground-level fall    Subjective:  Mr. Lewis is a 80-year-old man with past medical history remarkable for gout and hypertension who presented to the [facility] [place] [place] ED status post mechanical GLF, found to be septic secondary to LLE cellulitis, with A. fib with RVR, type II MI, ANGIE and acute hypoxic respiratory failure.  Patient reports that he has overextended his front wheel walker and fell onto his belly.  He did not hit his head, did not lose consciousness.      Consultants/Specialty:  Vascular surgery  Review of Systems:   Patient does not report any fevers or chills.  Denies chest pain, palpitation.  He does report shortness of breath, which is chronic.  No other complaints.  14 point review of system was conducted    Objective Data:   Physical Exam:   Vitals:   Temp:  [36.3 °C (97.4 °F)-36.9 °C (98.4 °F)] 36.6 °C (97.8 °F)  Pulse:  [] 98  Resp:  [18-19] 18  BP: (106-159)/(58-90) 135/86  SpO2:  [87 %-97 %] 94 %    General: Obese man laying in bed in no acute distress  HEENT: NC/AT.  PERRLA, EOMI.  External ear normal.  Nares patent, nonedematous nonerythematous.  Moist mucous membranes. Good dentition.  Trachea midline.   Neck: No JVP.  Carotid bruit could not be appreciated.  Nontender, nonnodular thyroid.  No anterior or posterior cervical, occipital, supraclavicular lymphadenopathy  Cardiovascular: PMI<2 cm at fifth costal space at midclavicular line.  No heaves appreciated.  No thrills.  Irregularly irregular tachycardia W/O M, R, G.  Pulmonary: Normal work of breathing.  Resonant to percussion.  CTAB, no wheezes, crackles, rhonchi heard in 10 lung fields  Abdomen: Obese, soft, nondistended.  Normoactive bowel sounds.  Nontender to percussion or palpation.  No  hepatosplenomegaly noted.  No fluid wave  Musculoskeletal: Normal tone and bulk.  Hemosiderin deposits noted bilaterally.  Edematous, erythematous left lower extremity, nonpurulent.  Skin: Warm and dry.  No rashes, bruises or lacerations noted  Neuro: Alert and oriented X4.  CN II-XII checked and intact.  5 out of 5 strength throughout.  DTR 2+ throughout.  FTN, HTS intact.  Sensation intact . negative Romberg.  Lymphatic: No edema or lymphadenopathy noted.  Psychiatric: Good mood congruent affect.  Thought form linear, content appropriate      Labs:   Recent Labs     06/20/21  2310   WBC 26.3*   RBC 4.83   HEMOGLOBIN 14.0   HEMATOCRIT 44.4   MCV 91.9   MCH 29.0   RDW 50.5*   PLATELETCT 148*   MPV 9.8   NEUTSPOLYS 86.70*   LYMPHOCYTES 6.30*   MONOCYTES 7.00   EOSINOPHILS 0.00   BASOPHILS 0.00   RBCMORPHOLO Present     Recent Labs     06/20/21  2310   SODIUM 138   POTASSIUM 5.4   CHLORIDE 105   CO2 23   GLUCOSE 133*   BUN 28*     Lactic acid:  3.4 on admission -> 1.5  TSH/T4: 1.75/1.17  BNP: 253  Pro-Manpreet 0.15    Imaging:   ECG remarkable for sinus tachycardia with inferior infarct of indeterminate age  CT-CTA: Remarkable for curvilinear low-attenuation area along the superior lateral aortic arch.  (Suspicious for intramural hematoma and focal dissection of indeterminate age).  Low density suggests protracted chronicity    Mr. Lewis is a 80-year-old man with past medical history remarkable for gout and hypertension who presented to the [facility] ED status post mechanical GLF, found to be septic secondary to LLE cellulitis, with A. 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    * Sepsis (HCC)- (present on admission)  Assessment & Plan  This is Sepsis Present on admission  SIRS criteria identified on my evaluation include: Fever, with temperature greater than 101 deg F  Source is likely left lower extremity  cellulitis  Sepsis protocol initiated  Fluid resuscitation ordered per protocol  IV antibiotics as appropriate for source of sepsis  While organ dysfunction may be noted elsewhere in this problem list or in the chart, degree of organ dysfunction does not meet CMS criteria for severe sepsis    S/P 30 ml/kg fluid resuscitation        Cellulitis- (present on admission)  Assessment & Plan  Cellulitis at LLE medial aspect to superior border of the malleolus  No obvious breakage of skin    -Discontinue Unasyn  -Start cefazolin 2 g every 8 hours for total of 5 days    A-fib (HCC)- (present on admission)  Assessment & Plan  New onset A. fib with RVR  Admitted with telemetry  Start carvedilol 6.25 mg twice daily  As needed enalaprilat 1.25  Continuer enoxaparin 150  Consider transition to rivaroxaban 20 pending improvement of ANGIE function  Cardiology consulted, appreciate [facility]'s      Chronic thoracic aortic dissection (HCC)  Assessment & Plan  Noted on CT-CTA at the superior lateral aortic arch  -Cardiovascular surgery consulted; do not recommend any acute interventions  -Follow-up as an outpatient upon discharge with cardiovascular surgery    Acute respiratory failure (HCC)- (present on admission)  Assessment & Plan  Requiring 2L nc on admission  RT  duonebs  Wheezing on auscultation   BNP ordered to assess  CT-CTA without evidence of PE      Morbidly obese (HCC)- (present on admission)  Assessment & Plan  BMI 47  OT/PT        Acute kidney injury (HCC)- (present on admission)  Assessment & Plan  Possibly acute on chronic kidney disease with current GFR of 35, BUN/creatinine 28/1.85  IV fluids and   Continue to monitor    Fall- (present on admission)  Assessment & Plan  Probable falls mechanical without loss of consciousness or injuries  Fall precautions in place  OT/PT to evaluate and treat    Type 2 myocardial infarction (HCC)- (present on admission)  Assessment & Plan  Serial troponins -472-97, likely secondary to  demand in the setting of A. fib with RVR  Repeated ECGs without signs of ischemia    Echo and stress test pending

## 2021-06-21 NOTE — ASSESSMENT & PLAN NOTE
Requiring 2L nc on admission  RT  duonebs  Wheezing on auscultation   BNP ordered to assess  CT-CTA without evidence of PE

## 2021-06-21 NOTE — ASSESSMENT & PLAN NOTE
Cellulitis at LLE medial aspect to superior border of the malleolus  No obvious breakage of skin    -Unasyn discontinued 6/21  -Cont cefazolin 2 g every 8 hours for total of 5 days

## 2021-06-21 NOTE — ASSESSMENT & PLAN NOTE
This is Sepsis Present on admission  SIRS criteria identified on my evaluation include: Fever, with temperature greater than 101 deg F  Source is likely left lower extremity cellulitis  Sepsis protocol initiated  Fluid resuscitation ordered per protocol  IV antibiotics as appropriate for source of sepsis  While organ dysfunction may be noted elsewhere in this problem list or in the chart, degree of organ dysfunction does not meet CMS criteria for severe sepsis    S/P 30 ml/kg fluid resuscitation

## 2021-06-21 NOTE — ED NOTES
Pt transferring to T711/01 via gurney on cardiac monitor w/ RN. Pt A&Ox4, 2L o2 nc, x1-2 assist. Pt belongings w/i possession. NAD noted.

## 2021-06-21 NOTE — CONSULTS
ACUTE CARE VASCULAR SERVICE  CONSULT NOTE      Date: 6/21/2021    Referring Provider: Aiden Hernandez DO    Consulting Physician: Hernandez Bello M.D. [facility]    -------------------------------------------------------------------------------------------------    Reason for consultation:  Aortic Abnormality    HPI:  This is a 80 y.o. male who is presenting with SOB and CTA for PE incidentally showed what appears to be a focal dissection of the distal aortic arch. Based on the CT images it appears to be chronic, thrombosed, and only involving a short segment of the aorta and does not extend down to mid thoracic aorta or beyond.  Patient is alert, conversant, and denies any upper back pain or chest pain at present or in the past. He has no specific complaints at this time. No abdominal pain, no leg pain    Past Medical History:   Diagnosis Date   • Hypertension        History reviewed. No pertinent surgical history.    Current Facility-Administered Medications   Medication Dose Route Frequency Provider Last Rate Last Admin   • senna-docusate (PERICOLACE or SENOKOT S) 8.6-50 MG per tablet 2 tablet  2 tablet Oral BID Cari Kiran M.D.        And   • polyethylene glycol/lytes (MIRALAX) PACKET 1 Packet  1 Packet Oral QDAY PRN Cari Kiran M.D.        And   • magnesium hydroxide (MILK OF MAGNESIA) suspension 30 mL  30 mL Oral QDAY PRN Cari Kiran M.D.        And   • bisacodyl (DULCOLAX) suppository 10 mg  10 mg Rectal QDAY PRN Cari Kiran M.D.       • acetaminophen (Tylenol) tablet 650 mg  650 mg Oral Q6HRS PRN Cari Kiran M.D.       • enalaprilat (VASOTEC) injection 1.25 mg  1.25 mg Intravenous Q6HRS PRN Cari Kiran M.D.       • labetalol (NORMODYNE/TRANDATE) injection 10 mg  10 mg Intravenous Q4HRS PRN Cari Kiran M.D.       • enoxaparin (LOVENOX) injection 150 mg  1 mg/kg Subcutaneous Q12HRS Cari Kiran M.D.   150 mg at 06/21/21 0542   • aspirin EC  (ECOTRIN) tablet 81 mg  81 mg Oral DAILY Cari Kiran M.D.   81 mg at 06/21/21 0542   • ondansetron (ZOFRAN) syringe/vial injection 4 mg  4 mg Intravenous Q4HRS PRN Cari Kiran M.D.       • ondansetron (ZOFRAN ODT) dispertab 4 mg  4 mg Oral Q4HRS PRN Cari Kiran M.D.       • ipratropium-albuterol (DUONEB) nebulizer solution  3 mL Nebulization Q4HRS (RT) Cari Kiran M.D.       • Metoprolol Tartrate (LOPRESSOR) injection 5 mg  5 mg Intravenous Q5 MIN PRN Cari Kiran M.D.       • ceFAZolin in dextrose (ANCEF) IVPB premix 2 g  2 g Intravenous Q8HRS Margie Rapp M.D.       • carvedilol (COREG) tablet 6.25 mg  6.25 mg Oral BID WITH MEALS Margie Rapp M.D.           Social History     Socioeconomic History   • Marital status:      Spouse name: Not on file   • Number of children: Not on file   • Years of education: Not on file   • Highest education level: Not on file   Occupational History   • Not on file   Tobacco Use   • Smoking status: Never Smoker   • Smokeless tobacco: Never Used   Vaping Use   • Vaping Use: Never used   Substance and Sexual Activity   • Alcohol use: Never   • Drug use: Never   • Sexual activity: Not on file   Other Topics Concern   • Not on file   Social History Narrative   • Not on file     Social Determinants of Health     Financial Resource Strain:    • Difficulty of Paying Living Expenses:    Food Insecurity:    • Worried About Running Out of Food in the Last Year:    • Ran Out of Food in the Last Year:    Transportation Needs:    • Lack of Transportation (Medical):    • Lack of Transportation (Non-Medical):    Physical Activity:    • Days of Exercise per Week:    • Minutes of Exercise per Session:    Stress:    • Feeling of Stress :    Social Connections:    • Frequency of Communication with Friends and Family:    • Frequency of Social Gatherings with Friends and Family:    • Attends Jewish Services:    • Active Member of Clubs or Organizations:    •  "Attends Club or Organization Meetings:    • Marital Status:    Intimate Partner Violence:    • Fear of Current or Ex-Partner:    • Emotionally Abused:    • Physically Abused:    • Sexually Abused:        History reviewed. No pertinent family history.    Allergies:  Patient has no known allergies.    Review of Systems:  Constitutional: Negative for fever, chills, weight loss,   HENT:   Negative for hearing loss or tinnitus    Eyes:    Negative for blurred vision, double vision, or loss of vision  Respiratory:  Mild dyspnea on admission, no orthopnea   Cardiac:  Negative for chest pain or palpitations or orthopnea  Vascular:  Negative for claudication or rest pain   Gastrointestinal: Negative for nausea, vomiting, or abdominal pain     Negative for hematochezia or melena   Genitourinary: Negative for dysuria, frequency, or hematuria   Musculoskeletal: Negative for myalgias, back pain, or joint pain  Skin:   Negative for itching or rash  Neurological:  Negative for dizziness, headaches, or tremors     Negative for speech disturbance     Negative for extremity weakness or paresthesias  Endo/Heme:  Negative for easy bruising or bleeding  Psychiatric:  Negative for depression, suicidal ideas, or hallucinations    Physical Exam:  /86   Pulse 98   Temp 36.6 °C (97.8 °F) (Temporal)   Resp 19   Ht 1.803 m (5' 11\")   Wt (!) 149 kg (327 lb 9.7 oz)   SpO2 94%     Constitutional: Alert, oriented, no acute distress  HEENT:  Normocephalic and atraumatic, EOMI  Neck:   Supple, no JVD, no bruits  Cardiovascular: Regular rate and rhythm, no murmurs  Pulmonary:  Good air entry bilaterally, no wheezing   Abdominal:  Soft, non-tender, non-distended     Aortic impulse not widened  Musculoskeletal: No edema, no tenderness  Neurological:  CN II-XII grossly intact, no focal deficits  Skin:   Skin is warm and dry. No rash noted.  Psychiatric:  Normal mood and affect.  Vascular:  Extremities warm and well perfused with strong " palpable DP pulses bilaterally      Labs:  Recent Labs     06/20/21 2310   WBC 26.3*   RBC 4.83   HEMOGLOBIN 14.0   HEMATOCRIT 44.4   MCV 91.9   MCH 29.0   MCHC 31.5*   RDW 50.5*   PLATELETCT 148*   MPV 9.8     Recent Labs     06/20/21 2310   SODIUM 138   POTASSIUM 5.4   CHLORIDE 105   CO2 23   GLUCOSE 133*   BUN 28*   CREATININE 1.85*   CALCIUM 9.9     Recent Labs     06/20/21 2350   APTT 25.7   INR 1.22*     Recent Labs     06/20/21 2310 06/20/21 2350   ASTSGOT 24  --    ALTSGPT 19  --    TBILIRUBIN 0.3  --    ALKPHOSPHAT 89  --    GLOBULIN 3.6*  --    INR  --  1.22*       Radiology:  CT Chest PE protocol:  1.  There is no CT evidence of acute pulmonary embolism.  2.  Curvilinear low-attenuation area along the superior lateral aortic arch suspicious for intramural hematoma and focal dissection, age indeterminate. The low density suggests this may be old blood.  3.  There is evidence of previous granulomatous inflammatory process.    Assessment/Plan:  -  Hypertension  -  Chronic aortic dissection    Aortic findings are chronic and non-concerning at this time. No additional imaging or intervention needed at this time.    Patient can follow-up with me as an outpatient for routine annual surveillance imaging.      Hernandez Bello MD  [facility] (General and Vascular Surgery)  Cell: [phone] (text or call is fine, if you don't reach me please try my office)  Office: [phone]    6/21/2021    2:08 PM  ___________________________________________________________________  Patient:David Lewis   MRN:[id number]   CSN:[id number]

## 2021-06-21 NOTE — ASSESSMENT & PLAN NOTE
Noted on CT-CTA at the superior lateral aortic arch  -Cardiovascular surgery consulted; do not recommend any acute interventions  -Follow-up as an outpatient upon discharge with cardiovascular surgery  -Blood pressure control, goals less than 130/80

## 2021-06-21 NOTE — PROGRESS NOTES
Assumed care of PT A&O 4. Pt resting in bed with no signs of labored breathing. On 2L NC. Tele monitor in place, cardiac rhythm being monitored. Call light within reach, bed in lowest position, upper bed rails up. Pt was updated on plan of care for the day. Will continue to monitor.

## 2021-06-21 NOTE — ASSESSMENT & PLAN NOTE
Probable falls mechanical without loss of consciousness or injuries  Fall precautions in place  OT/PT to evaluate and treat

## 2021-06-21 NOTE — ED TRIAGE NOTES
"Chief Complaint   Patient presents with   • Fall     pt had fall going from kitchen to BR, landed forward on stomach onto tile floor. -head injury, -neck pain, pt states he was using walker and walker got too ahead of him.    • Atrial Fibrillation     upon fire arrival pt was in afib rvr, fire gave 324 ASA. ems stated pt still in afib rvr 120-150 w/ PVCs. pt has no hx of afib or heart issues.        BIB EMS to R1, pt on monitor and in gown, labs drawn and sent. Pt consists of: above complaint, pt A&Ox4, denies dizziness/lightheadedness or CP. Denies pain. Pt has L ankle swollen, red, and tender; states it has been like that for a couple days.    Medications given en route:Fire gave 324 ASA, EMS gave 1g APAP for temp of 101.2f.     /72   Pulse (!) 140   Temp 36.9 °C (98.4 °F) (Temporal)   Resp 18   Ht 1.803 m (5' 11\")   Wt (!) 154 kg (339 lb 8.1 oz)   SpO2 95%   BMI 47.35 kg/m²   "

## 2021-06-21 NOTE — ED NOTES
Med Rec completed: per patient at bedside with family   Preferred Pharmacy: [id number]  Allergies:  No Known Allergies    No ORAL antibiotics in last 14 days    Home Medications:  Medication Sig Comments   • allopurinol (ZYLOPRIM) 100 MG Tab Take 100 mg by mouth every morning.    Takes 300 mg capsule and 100 mg capsule to equal 400 mg every morning   • allopurinol (ZYLOPRIM) 300 MG Tab Take 300 mg by mouth every morning.  Takes 300 mg capsule and 100 mg capsule to equal 400 mg every morning   • lisinopril (PRINIVIL) 20 MG Tab Take 20 mg by mouth 2 times a day.    • metoprolol SR (TOPROL XL) 25 MG TABLET SR 24 HR Take 25 mg by mouth every day. Reports is prescribed daily but has only been taking occasionally because he forgets to take because they told him he cannot take the same time as his other medications. Last dose est. 2 weeks ago.

## 2021-06-21 NOTE — ASSESSMENT & PLAN NOTE
Serial troponins -083-97, likely secondary to demand in the setting of A. fib with RVR  Repeated ECGs without signs of ischemia    Echocardiograms WNL  Stress test WNL

## 2021-06-21 NOTE — ASSESSMENT & PLAN NOTE
Possibly acute on chronic kidney disease with current GFR of 35, BUN/creatinine 28/1.85  IV fluids and   Continue to monitor

## 2021-06-21 NOTE — CONSULTS
Cardiology Initial Consult Note    Date of note:    6/21/2021      Consulting Physician: JORGE Cabrera*    Name:   David Lewis     YOB: 1940  Age:   80 y.o.  male   MRN:   [id number]      Reason for Consultation: Elevated troponin    HPI:  David Lewis is a 80 y.o. male with a history of hypertension who presented to the hospital after a mechanical fall.  On presentation, he was found to be in new onset Afib with RVR and elevated troponin for which cardiology is consulted.    Patient states that prior to his presentation to the hospital he was able to perform his ADLs without any significant chest pain, pressure, palpitations, lightheadedness or dizziness.  He has been wheelchair-bound for the past 3 years 2/2 knee problems.  Reports exertional dyspnea with activities due to deconditioning and morbid obesity.  Also noted bilateral lower extremity swelling yesterday which he did not notice prior.  Patient remains chest pain-free while in the hospital.    For new onset A. fib with RVR, patient denies any palpitations.      ROS  All others reviewed and negative except for pertinent positives mentioned in HPI.      Past Medical History:   Diagnosis Date   • Hypertension        History reviewed. No pertinent surgical history.      Medications Prior to Admission   Medication Sig Dispense Refill Last Dose   • allopurinol (ZYLOPRIM) 100 MG Tab Take 100 mg by mouth every morning. Takes 300 mg capsule and 100 mg capsule to equal 400 mg every morning   6/20/2021 at 1200   • allopurinol (ZYLOPRIM) 300 MG Tab Take 300 mg by mouth every morning. Takes 300 mg capsule and 100 mg capsule to equal 400 mg every morning   6/20/2021 at 1200   • lisinopril (PRINIVIL) 20 MG Tab Take 20 mg by mouth 2 times a day.   6/20/2021 at 1200   • metoprolol SR (TOPROL XL) 25 MG TABLET SR 24 HR Take 25 mg by mouth every day.     About 2 weeks ago at [facility]      Current Facility-Administered Medications   Medication Dose Route  Frequency Provider Last Rate Last Admin   • senna-docusate (PERICOLACE or SENOKOT S) 8.6-50 MG per tablet 2 tablet  2 tablet Oral BID Cari Kiran M.D.        And   • polyethylene glycol/lytes (MIRALAX) PACKET 1 Packet  1 Packet Oral QDAY PRN Cari Kiran M.D.        And   • magnesium hydroxide (MILK OF MAGNESIA) suspension 30 mL  30 mL Oral QDAY PRN Cari Kiran M.D.        And   • bisacodyl (DULCOLAX) suppository 10 mg  10 mg Rectal QDAY PRN Cari Kiran M.D.       • acetaminophen (Tylenol) tablet 650 mg  650 mg Oral Q6HRS PRN Cari Kiran M.D.       • enalaprilat (VASOTEC) injection 1.25 mg  1.25 mg Intravenous Q6HRS PRN Cari Kiran M.D.       • labetalol (NORMODYNE/TRANDATE) injection 10 mg  10 mg Intravenous Q4HRS PRN Cari Kiran M.D.       • metoprolol SR (TOPROL XL) tablet 50 mg  50 mg Oral Q DAY Cari Kiran M.D.   50 mg at 06/21/21 0542   • enoxaparin (LOVENOX) injection 150 mg  1 mg/kg Subcutaneous Q12HRS Cari Kiran M.D.   150 mg at 06/21/21 0542   • aspirin EC (ECOTRIN) tablet 81 mg  81 mg Oral DAILY Cari Kiran M.D.   81 mg at 06/21/21 0542   • ondansetron (ZOFRAN) syringe/vial injection 4 mg  4 mg Intravenous Q4HRS PRN Cari Kiran M.D.       • ondansetron (ZOFRAN ODT) dispertab 4 mg  4 mg Oral Q4HRS PRN Cari Kiran M.D.       • ampicillin/sulbactam (UNASYN) 3 g in  mL IVPB  3 g Intravenous Q6HR Cari Kiran M.D.   Stopped at 06/21/21 0611   • ipratropium-albuterol (DUONEB) nebulizer solution  3 mL Nebulization Q4HRS (RT) Cari Kiran M.D.       • Metoprolol Tartrate (LOPRESSOR) injection 5 mg  5 mg Intravenous Q5 MIN PRN Cari Kiran M.D.           No Known Allergies    History reviewed. No pertinent family history.    Social History     Socioeconomic History   • Marital status:      Spouse name: Not on file   • Number of children: Not on file   • Years of education: Not on file   • Highest education level:  "Not on file   Occupational History   • Not on file   Tobacco Use   • Smoking status: Never Smoker   • Smokeless tobacco: Never Used   Vaping Use   • Vaping Use: Never used   Substance and Sexual Activity   • Alcohol use: Never   • Drug use: Never   • Sexual activity: Not on file   Other Topics Concern   • Not on file   Social History Narrative   • Not on file     Social Determinants of Health     Financial Resource Strain:    • Difficulty of Paying Living Expenses:    Food Insecurity:    • Worried About Running Out of Food in the Last Year:    • Ran Out of Food in the Last Year:    Transportation Needs:    • Lack of Transportation (Medical):    • Lack of Transportation (Non-Medical):    Physical Activity:    • Days of Exercise per Week:    • Minutes of Exercise per Session:    Stress:    • Feeling of Stress :    Social Connections:    • Frequency of Communication with Friends and Family:    • Frequency of Social Gatherings with Friends and Family:    • Attends Christian Services:    • Active Member of Clubs or Organizations:    • Attends Club or Organization Meetings:    • Marital Status:    Intimate Partner Violence:    • Fear of Current or Ex-Partner:    • Emotionally Abused:    • Physically Abused:    • Sexually Abused:          Intake/Output Summary (Last 24 hours) at 6/21/2021 0917  Last data filed at 6/21/2021 0847  Gross per 24 hour   Intake 120 ml   Output 250 ml   Net -130 ml        Physical ExamBody mass index is 47.35 kg/m²./65   Pulse 82   Temp 36.3 °C (97.4 °F) (Temporal)   Resp 18   Ht 1.803 m (5' 11\")   Wt (!) 154 kg (339 lb 8.1 oz)   SpO2 95% Vitals:    06/21/21 0245 06/21/21 0300 06/21/21 0523 06/21/21 0847   BP: 148/65 159/90 122/71 120/65   Pulse: 83 (!) 112 86 82   Resp:   19 18   Temp:   36.6 °C (97.8 °F) 36.3 °C (97.4 °F)   TempSrc:   Temporal Temporal   SpO2: 88% 97% 96% 95%   Weight:       Height:         Oxygen Therapy: Pulse Oximetry: 95 %, O2 (LPM): 3, O2 Delivery Device: None " - Room Air    General: Well appearing and in no apparent distress  Eyes: nl conjunctiva  ENT: OP clear  Neck: JVP not elevated,  no carotid bruits  Lungs: normal respiratory effort, CTAB  Heart: irregular rhythm, no murmurs, no rubs or gallops, mild pitting edema bilateral lower extremities. No LV/RV heave on cardiac palpatation. 2+ bilateral radial pulses.  2+ bilateral dp pulses.   Abdomen: soft, non tender, non distended, no masses, normal bowel sounds.  No HSM.  Extremities/MSK: no clubbing, no cyanosis  Neurological: No focal sensory deficits  Psychiatric: Appropriate affect, A/O x 3  Skin: Warm extremities      Labs (personally reviewed and notable for): Lab Results   Component Value Date/Time    SODIUM 138 06/20/2021 11:10 PM    POTASSIUM 5.4 06/20/2021 11:10 PM    CHLORIDE 105 06/20/2021 11:10 PM    CO2 23 06/20/2021 11:10 PM    GLUCOSE 133 (H) 06/20/2021 11:10 PM    BUN 28 (H) 06/20/2021 11:10 PM    CREATININE 1.85 (H) 06/20/2021 11:10 PM    Lab Results   Component Value Date/Time    WBC 26.3 (H) 06/20/2021 11:10 PM    RBC 4.83 06/20/2021 11:10 PM    HEMOGLOBIN 14.0 06/20/2021 11:10 PM    HEMATOCRIT 44.4 06/20/2021 11:10 PM    MCV 91.9 06/20/2021 11:10 PM    MCH 29.0 06/20/2021 11:10 PM    MCHC 31.5 (L) 06/20/2021 11:10 PM    MPV 9.8 06/20/2021 11:10 PM    NEUTSPOLYS 86.70 (H) 06/20/2021 11:10 PM    LYMPHOCYTES 6.30 (L) 06/20/2021 11:10 PM    MONOCYTES 7.00 06/20/2021 11:10 PM    EOSINOPHILS 0.00 06/20/2021 11:10 PM    BASOPHILS 0.00 06/20/2021 11:10 PM    ANISOCYTOSIS 1+ 06/20/2021 11:10 PM          Lab Results   Component Value Date/Time    TROPONINT 117 (H) 06/21/2021 0558     Lab Results   Component Value Date/Time    NTPROBNP 253 (H) 06/20/2021 2310          Cardiac Imaging and Procedures Review:    EKG dated 6/20/2021: My personal interpretation is atrial fib with ventricular rate 148 bpm       Radiology test Review:DX-CHEST-PORTABLE (1 VIEW)   Final Result      No acute cardiopulmonary abnormality  "identified.      EC-ECHOCARDIOGRAM COMPLETE W/O CONT    (Results Pending)   CT-CTA CHEST PULMONARY ARTERY W/ RECONS    (Results Pending)     Patient Active Problem List   Diagnosis   • Sepsis (HCC)   • A-fib (HCC)   • Elevated troponin   • Fall   • Acute kidney injury (HCC)   • Morbidly obese (HCC)   • Cellulitis   • Acute respiratory failure (HCC)       Impression and Medical Decision Making:  #New onset Afib with RVR  #Elevated troponin  #Acute kidney injury in a patient with unknown baseline  #Sepsis  #Acute hypoxic respiratory failure - on supplemental oxygen  #Morbid obesity  #Deconditioning  #Lactic acidosis - resolved    Recommendations:  --Patient's [id number] score is 3.  Is currently on anticoagulation with lovenox and rate control with Toprol-XL 50 mg daily. Patient has been in Afib for unknown period of time, hence, avoid rhythm control strategy.  Will need to be transitioned to oral anticoagulation with Eliquis 5 mg twice daily if kidney function stays stable.    --Troponins have been -117 in a patient with acute kidney injury.  Patient denies any active chest pain and EKG does not show ST-T wave abnormality concerning for ACS.  Trend troponin and EKG for now.    --Obtain echocardiogram to evaluate underlying cardiac structure and function.    --If no regional wall motion abnormality noted on the echo with preserved EF, reasonable to perform nuclear stress test to rule out obstructive CAD as troponin are essentially \"flat\" and indeterminate.    --Unclear cause of acute hypoxia requiring supplemental oxygen.  CTA chest to rule out PE given sedentary lifestyle.      Thank you for allowing me to participate in the care of this patient, I will continue to follow.  Please contact me with any questions.      Jamie Covarrubias M.D.  Cardiologist, [facility] [place]   [phone]    This note was generated using voice recognition software which has a small chance of producing errors of " grammar and possibly content. I have made every reasonable attempt to find and correct any obvious errors, but expect that some may not be found prior to finalization of this note.

## 2021-06-21 NOTE — PROGRESS NOTES
4 Eyes Skin Assessment Completed by PEG Palafox and PEG Powers.    Head WDL  Ears WDL  Nose WDL  Mouth WDL  Neck WDL  Breast/Chest WDL  Shoulder Blades WDL  Spine WDL  (R) Arm/Elbow/Hand WDL  (L) Arm/Elbow/Hand WDL  Abdomen WDL  Groin WDL  Scrotum/Coccyx/Buttocks WDL  (R) Leg WDL  (L) Leg Scab  (R) Heel/Foot/Toe Redness, Blanching and Edema    Laceration to bottom of big toe  (L) Heel/Foot/Toe Redness, Blanching and Edema              Devices In Places Tele Box and Pulse Ox      Interventions In Place Gray Ear Foams, Pillows and Q2 Turns    Possible Skin Injury Yes    Pictures Uploaded Into Epic No, needs to be completed  Wound Consult Placed Yes  RN Wound Prevention Protocol Ordered Yes

## 2021-06-21 NOTE — PROGRESS NOTES
Received bedside report from RN, pt care assumed @ 0700, VSS, pt assessment complete. 3L NC. Pt AAOx4, no c/o pain at this time. Tele box in place. No signs of acute distress noted at this time. POC discussed with pt and verbalizes no questions. Pt denies any additional needs at this time. Bed in lowest position, bed alarm in place, pt educated on fall risk and verbalized understanding, call light within reach, hourly rounding initiated.

## 2021-06-21 NOTE — H&P
Hospital Medicine History & Physical Note    Date of Service  6/21/2021    Primary Care Physician  Pcp Pt States None    Consultants      Code Status  Full Code    Chief Complaint  Chief Complaint   Patient presents with   • Fall     pt had fall going from kitchen to BR, landed forward on stomach onto tile floor. -head injury, -neck pain, pt states he was using walker and walker got too ahead of him.    • Atrial Fibrillation     upon fire arrival pt was in afib rvr, fire gave 324 ASA. ems stated pt still in afib rvr 120-150 w/ PVCs. pt has no hx of afib or heart issues.        History of Presenting Illness  80 y.o. male with a past medical history of hypertension who presented 6/20/2021 with a fall that happened earlier today.  The patient states that he was walking to the bathroom when his walker ended up too far out in front of him and he fell forward. The patient's toes started bleeding after his fall. The patient adds that he is taking Lisinopril for his hypertension and is currently seeing . EMS noted a fever of 101.2 °F and he was given Tylenol. He has had a stress test in the past and the results were normal.     Notable findings include heart rate 140, blood pressure 125/72, BMI 47, troponin 89, lactic acid 3.4, glucose 133, BUN/creatinine 28/1.85, WBC 26.3 GFR 35, d-dimer of 4.5    Chest x-ray showing no acute cardiopulmonary abnormality    EKG showing A. fib with rapid RVR    Patient is admitted with telemetry to hospitalist service for medical management.    Review of Systems  Review of Systems   Cardiovascular: Positive for leg swelling.   Musculoskeletal: Positive for falls.   Skin:        TTP at LLE   All other systems reviewed and are negative.      Past Medical History   has a past medical history of Hypertension. reviewed    Surgical History  B/L knee replacements    Family History  Mother: DM2, passed from MI, heart disease    Social History   reports that he has never smoked. He has never  used smokeless tobacco. He reports that he does not drink alcohol and does not use drugs.   reviewed.  Allergies  No Known Allergies    Medications  None       Physical Exam  Temp:  [36.9 °C (98.4 °F)] 36.9 °C (98.4 °F)  Pulse:  [139-140] 139  Resp:  [18] 18  BP: (121-125)/(58-72) 121/58  SpO2:  [95 %] 95 %    Physical Exam       Constitutional: Resting in acute respiratory failure requiring 2L NC  HENT: Normocephalic, no obvious evidence of acute trauma.  Eyes: No scleral icterus. Normal conjunctiva   Neck: Comfortable movement without any obvious restriction in the range of motion.  Cardiovascular: tachycardic, irregularly irregular  Thorax & Lungs: acute respiratory distress requiring 2L NC. Wheezing heard in all lung fields there is no obvious chest wall tenderness. I appreciate reduced air movement throughout  Abdomen: The abdomen is not visibly distended. Upon palpation, I find it to be without tenderness.  No mass appreciated.  Skin: changes of venous stasis dermatitis at b/l LE with TTP at LLE and erythema, edema  Extremities/Musculoskeletal: 1+ pitting edema at b/l LE  Neurologic: Alert & oriented. No focal deficits observed.   Psychiatric: Normal affect appropriate for the clinical situation.    Laboratory:  Recent Labs     06/20/21 2310   WBC 26.3*   RBC 4.83   HEMOGLOBIN 14.0   HEMATOCRIT 44.4   MCV 91.9   MCH 29.0   MCHC 31.5*   RDW 50.5*   PLATELETCT 148*   MPV 9.8     Recent Labs     06/20/21  2310   SODIUM 138   POTASSIUM 5.4   CHLORIDE 105   CO2 23   GLUCOSE 133*   BUN 28*   CREATININE 1.85*   CALCIUM 9.9     Recent Labs     06/20/21  2310   ALTSGPT 19   ASTSGOT 24   ALKPHOSPHAT 89   TBILIRUBIN 0.3   GLUCOSE 133*     Recent Labs     06/20/21  2350   APTT 25.7   INR 1.22*     Recent Labs     06/20/21  2310   NTPROBNP 253*         Recent Labs     06/20/21  2310   TROPONINT 89*       Imaging:  DX-CHEST-PORTABLE (1 VIEW)   Final Result      No acute cardiopulmonary abnormality identified.       EC-ECHOCARDIOGRAM COMPLETE W/O CONT    (Results Pending)   CT-CTA CHEST PULMONARY ARTERY W/ RECONS    (Results Pending)         Assessment/Plan:  I anticipate this patient will require at least two midnights for appropriate medical management, necessitating inpatient admission.    A-fib (HCC)- (present on admission)  Assessment & Plan  New onset A. fib with RVR  Admitted with telemetry  Antihypertensives as needed in place  Consider cardiology consult    Sepsis (HCC)- (present on admission)  Assessment & Plan  This is Sepsis Present on admission  SIRS criteria identified on my evaluation include: Fever, with temperature greater than 101 deg F  Source is unknown  Sepsis protocol initiated  Fluid resuscitation ordered per protocol  IV antibiotics as appropriate for source of sepsis  While organ dysfunction may be noted elsewhere in this problem list or in the chart, degree of organ dysfunction does not meet CMS criteria for severe sepsis          Elevated troponin- (present on admission)  Assessment & Plan  Troponin 89 in ED  Repeat troponins and repeat EKGs ordered  Has negative stress test in the past per patient  Echo and stress test ordered    Acute respiratory failure (HCC)- (present on admission)  Assessment & Plan  Requiring 2L nc on admission  RT  duonebs  Wheezing on auscultation   BNP ordered to assess  Elevated d-dimer and CTA ordered to assess      Cellulitis- (present on admission)  Assessment & Plan  Cellulitis at LLE POA  Unasyn and IVF in place  Continue to monitor    Morbidly obese (HCC)- (present on admission)  Assessment & Plan  BMI 47  OT/PT    Consider nutrition consult prior to discharge    Acute kidney injury (HCC)- (present on admission)  Assessment & Plan  Possibly acute on chronic kidney disease with current GFR of 35, BUN/creatinine 28/1.85  IV fluids and IV antibiotics in place  Continue to monitor    Fall- (present on admission)  Assessment & Plan  Precautions in place  OT/PT

## 2021-06-22 ENCOUNTER — APPOINTMENT (OUTPATIENT)
Dept: CARDIOLOGY | Facility: MEDICAL CENTER | Age: 81
DRG: 871 | End: 2021-06-22
Attending: STUDENT IN AN ORGANIZED HEALTH CARE EDUCATION/TRAINING PROGRAM
Payer: MEDICARE

## 2021-06-22 LAB
ALBUMIN SERPL BCP-MCNC: 3.7 G/DL (ref 3.2–4.9)
ALBUMIN/GLOB SERPL: 1.1 G/DL
ALP SERPL-CCNC: 71 U/L (ref 30–99)
ALT SERPL-CCNC: 20 U/L (ref 2–50)
ANION GAP SERPL CALC-SCNC: 10 MMOL/L (ref 7–16)
AST SERPL-CCNC: 50 U/L (ref 12–45)
BASOPHILS # BLD AUTO: 0 % (ref 0–1.8)
BASOPHILS # BLD: 0 K/UL (ref 0–0.12)
BILIRUB SERPL-MCNC: 0.5 MG/DL (ref 0.1–1.5)
BUN SERPL-MCNC: 22 MG/DL (ref 8–22)
CALCIUM SERPL-MCNC: 9.4 MG/DL (ref 8.5–10.5)
CHLORIDE SERPL-SCNC: 104 MMOL/L (ref 96–112)
CHOLEST SERPL-MCNC: 116 MG/DL (ref 100–199)
CO2 SERPL-SCNC: 23 MMOL/L (ref 20–33)
CREAT SERPL-MCNC: 1.61 MG/DL (ref 0.5–1.4)
EOSINOPHIL # BLD AUTO: 0.33 K/UL (ref 0–0.51)
EOSINOPHIL NFR BLD: 1.7 % (ref 0–6.9)
ERYTHROCYTE [DISTWIDTH] IN BLOOD BY AUTOMATED COUNT: 50.8 FL (ref 35.9–50)
GLOBULIN SER CALC-MCNC: 3.5 G/DL (ref 1.9–3.5)
GLUCOSE SERPL-MCNC: 95 MG/DL (ref 65–99)
HCT VFR BLD AUTO: 44.1 % (ref 42–52)
HDLC SERPL-MCNC: 38 MG/DL
HGB BLD-MCNC: 13.5 G/DL (ref 14–18)
LDLC SERPL CALC-MCNC: 61 MG/DL
LV EJECT FRACT  99904: 60
LV EJECT FRACT MOD 2C 99903: 51.67
LV EJECT FRACT MOD 4C 99902: 66.66
LV EJECT FRACT MOD BP 99901: 60.5
LYMPHOCYTES # BLD AUTO: 2.35 K/UL (ref 1–4.8)
LYMPHOCYTES NFR BLD: 12.2 % (ref 22–41)
MANUAL DIFF BLD: NORMAL
MCH RBC QN AUTO: 28.2 PG (ref 27–33)
MCHC RBC AUTO-ENTMCNC: 30.6 G/DL (ref 33.7–35.3)
MCV RBC AUTO: 92.3 FL (ref 81.4–97.8)
MONOCYTES # BLD AUTO: 2.35 K/UL (ref 0–0.85)
MONOCYTES NFR BLD AUTO: 12.2 % (ref 0–13.4)
MORPHOLOGY BLD-IMP: NORMAL
NEUTROPHILS # BLD AUTO: 14.26 K/UL (ref 1.82–7.42)
NEUTROPHILS NFR BLD: 73.9 % (ref 44–72)
NRBC # BLD AUTO: 0 K/UL
NRBC BLD-RTO: 0 /100 WBC
PLATELET # BLD AUTO: 152 K/UL (ref 164–446)
PLATELET BLD QL SMEAR: NORMAL
PMV BLD AUTO: 10.5 FL (ref 9–12.9)
POTASSIUM SERPL-SCNC: 4.5 MMOL/L (ref 3.6–5.5)
PROT SERPL-MCNC: 7.2 G/DL (ref 6–8.2)
RBC # BLD AUTO: 4.78 M/UL (ref 4.7–6.1)
RBC BLD AUTO: NORMAL
SODIUM SERPL-SCNC: 137 MMOL/L (ref 135–145)
TRIGL SERPL-MCNC: 85 MG/DL (ref 0–149)
WBC # BLD AUTO: 19.3 K/UL (ref 4.8–10.8)

## 2021-06-22 PROCEDURE — 93306 TTE W/DOPPLER COMPLETE: CPT | Mod: 26 | Performed by: INTERNAL MEDICINE

## 2021-06-22 PROCEDURE — 93306 TTE W/DOPPLER COMPLETE: CPT

## 2021-06-22 PROCEDURE — 85007 BL SMEAR W/DIFF WBC COUNT: CPT

## 2021-06-22 PROCEDURE — 80061 LIPID PANEL: CPT

## 2021-06-22 PROCEDURE — 99232 SBSQ HOSP IP/OBS MODERATE 35: CPT | Performed by: INTERNAL MEDICINE

## 2021-06-22 PROCEDURE — 700102 HCHG RX REV CODE 250 W/ 637 OVERRIDE(OP): Performed by: STUDENT IN AN ORGANIZED HEALTH CARE EDUCATION/TRAINING PROGRAM

## 2021-06-22 PROCEDURE — 700111 HCHG RX REV CODE 636 W/ 250 OVERRIDE (IP): Performed by: STUDENT IN AN ORGANIZED HEALTH CARE EDUCATION/TRAINING PROGRAM

## 2021-06-22 PROCEDURE — 97161 PT EVAL LOW COMPLEX 20 MIN: CPT

## 2021-06-22 PROCEDURE — A9270 NON-COVERED ITEM OR SERVICE: HCPCS | Performed by: STUDENT IN AN ORGANIZED HEALTH CARE EDUCATION/TRAINING PROGRAM

## 2021-06-22 PROCEDURE — 700117 HCHG RX CONTRAST REV CODE 255: Performed by: STUDENT IN AN ORGANIZED HEALTH CARE EDUCATION/TRAINING PROGRAM

## 2021-06-22 PROCEDURE — 85027 COMPLETE CBC AUTOMATED: CPT

## 2021-06-22 PROCEDURE — 770020 HCHG ROOM/CARE - TELE (206)

## 2021-06-22 PROCEDURE — 87040 BLOOD CULTURE FOR BACTERIA: CPT | Mod: 91

## 2021-06-22 PROCEDURE — 80053 COMPREHEN METABOLIC PANEL: CPT

## 2021-06-22 PROCEDURE — 99232 SBSQ HOSP IP/OBS MODERATE 35: CPT | Mod: GC | Performed by: INTERNAL MEDICINE

## 2021-06-22 PROCEDURE — 36415 COLL VENOUS BLD VENIPUNCTURE: CPT

## 2021-06-22 PROCEDURE — 97165 OT EVAL LOW COMPLEX 30 MIN: CPT

## 2021-06-22 RX ORDER — IPRATROPIUM BROMIDE AND ALBUTEROL SULFATE 2.5; .5 MG/3ML; MG/3ML
3 SOLUTION RESPIRATORY (INHALATION)
Status: DISCONTINUED | OUTPATIENT
Start: 2021-06-22 | End: 2021-06-25 | Stop reason: HOSPADM

## 2021-06-22 RX ADMIN — ENOXAPARIN SODIUM 150 MG: 150 INJECTION SUBCUTANEOUS at 17:54

## 2021-06-22 RX ADMIN — ASPIRIN 81 MG: 81 TABLET, COATED ORAL at 05:24

## 2021-06-22 RX ADMIN — ENOXAPARIN SODIUM 150 MG: 150 INJECTION SUBCUTANEOUS at 05:24

## 2021-06-22 RX ADMIN — CEFAZOLIN SODIUM 2 G: 2 INJECTION, SOLUTION INTRAVENOUS at 15:19

## 2021-06-22 RX ADMIN — CEFAZOLIN SODIUM 2 G: 2 INJECTION, SOLUTION INTRAVENOUS at 22:22

## 2021-06-22 RX ADMIN — HUMAN ALBUMIN MICROSPHERES AND PERFLUTREN 3 ML: 10; .22 INJECTION, SOLUTION INTRAVENOUS at 17:30

## 2021-06-22 RX ADMIN — CARVEDILOL 6.25 MG: 6.25 TABLET, FILM COATED ORAL at 17:54

## 2021-06-22 RX ADMIN — CARVEDILOL 6.25 MG: 6.25 TABLET, FILM COATED ORAL at 09:24

## 2021-06-22 RX ADMIN — CEFAZOLIN SODIUM 2 G: 2 INJECTION, SOLUTION INTRAVENOUS at 05:24

## 2021-06-22 ASSESSMENT — COGNITIVE AND FUNCTIONAL STATUS - GENERAL
DRESSING REGULAR UPPER BODY CLOTHING: A LITTLE
SUGGESTED CMS G CODE MODIFIER DAILY ACTIVITY: CK
MOBILITY SCORE: 18
MOVING TO AND FROM BED TO CHAIR: A LITTLE
DRESSING REGULAR LOWER BODY CLOTHING: A LOT
STANDING UP FROM CHAIR USING ARMS: A LITTLE
TOILETING: A LITTLE
TURNING FROM BACK TO SIDE WHILE IN FLAT BAD: A LITTLE
HELP NEEDED FOR BATHING: A LITTLE
DAILY ACTIVITIY SCORE: 19
WALKING IN HOSPITAL ROOM: A LITTLE
MOVING FROM LYING ON BACK TO SITTING ON SIDE OF FLAT BED: A LITTLE
CLIMB 3 TO 5 STEPS WITH RAILING: A LITTLE
SUGGESTED CMS G CODE MODIFIER MOBILITY: CK

## 2021-06-22 ASSESSMENT — PAIN DESCRIPTION - PAIN TYPE: TYPE: ACUTE PAIN

## 2021-06-22 ASSESSMENT — GAIT ASSESSMENTS
DEVIATION: SHUFFLED GAIT;DECREASED HEEL STRIKE;DECREASED TOE OFF;BRADYKINETIC
GAIT LEVEL OF ASSIST: SUPERVISED
ASSISTIVE DEVICE: FRONT WHEEL WALKER
DISTANCE (FEET): 100

## 2021-06-22 ASSESSMENT — PATIENT HEALTH QUESTIONNAIRE - PHQ9
1. LITTLE INTEREST OR PLEASURE IN DOING THINGS: NOT AT ALL
2. FEELING DOWN, DEPRESSED, IRRITABLE, OR HOPELESS: NOT AT ALL
SUM OF ALL RESPONSES TO PHQ9 QUESTIONS 1 AND 2: 0

## 2021-06-22 ASSESSMENT — COPD QUESTIONNAIRES
DO YOU EVER COUGH UP ANY MUCUS OR PHLEGM?: NO/ONLY WITH OCCASIONAL COLDS OR INFECTIONS
HAVE YOU SMOKED AT LEAST 100 CIGARETTES IN YOUR ENTIRE LIFE: NO/DON'T KNOW
COPD SCREENING SCORE: 3
DURING THE PAST 4 WEEKS HOW MUCH DID YOU FEEL SHORT OF BREATH: SOME OF THE TIME

## 2021-06-22 ASSESSMENT — ACTIVITIES OF DAILY LIVING (ADL): TOILETING: INDEPENDENT

## 2021-06-22 NOTE — THERAPY
Physical Therapy   Initial Evaluation     Patient Name: David Lewis  Age:  80 y.o., Sex:  male  Medical Record #: [id number]  Today's Date: 6/22/2021     Precautions: Fall Risk    Assessment  Patient is 80 y.o. male admitted with GLF, sepsis 2/2 LLE cellulitis, afib with RVR, ANGIE. Since admission, pt has converted back to NSR. PMH includes gout, HTN.  Pt appears to be at his baseline for functional mobility ambulating short household distances with FWW and SPV. Patient will not be actively followed for physical therapy services at this time, however may be seen if requested by physician for 1 more visit within 30 days to address any discharge or equipment needs.    Plan    Recommend Physical Therapy for Evaluation only    DC Equipment Recommendations: None  Discharge Recommendations: Anticipate that the patient will have no further physical therapy needs after discharge from the hospital          06/22/21 1055   Prior Living Situation   Prior Services None   Housing / Facility 1 Story House   Steps Into Home 1   Steps In Home 0   Elevator No   Equipment Owned 4-Wheel Walker;Wheelchair;Grab Bar(s) In Tub / Shower;Raised Toilet Seat Without Arms;Hand Held Shower;Front-Wheel Walker   Lives with - Patient's Self Care Capacity Spouse   Comments spouse can assist as needed with IADLs   Prior Level of Functional Mobility   Bed Mobility Independent   Transfer Status Independent   Ambulation Independent   Distance Ambulation (Feet)   (household distances)   Assistive Devices Used Front-Wheel Walker   Comments independent with mobility prior    History of Falls   History of Falls Yes   Cognition    Level of Consciousness Alert   Comments cooperative   Gait Analysis   Gait Level Of Assist Supervised   Assistive Device Front Wheel Walker   Distance (Feet) 100   # of Times Distance was Traveled 1   Deviation Shuffled Gait;Decreased Heel Strike;Decreased Toe Off;Bradykinetic   # of Stairs Climbed 0   Weight Bearing Status no  restrictions   Comments no LOB   Bed Mobility    Supine to Sit Supervised   Sit to Supine Supervised   Scooting Supervised   Functional Mobility   Sit to Stand Supervised   Bed, Chair, Wheelchair Transfer Supervised   Transfer Method Stand Step   Mobility in room and hallway with FWW   Anticipated Discharge Equipment and Recommendations   DC Equipment Recommendations None   Discharge Recommendations Anticipate that the patient will have no further physical therapy needs after discharge from the hospital

## 2021-06-22 NOTE — CARE PLAN
The patient is Stable - Low risk of patient condition declining or worsening    Shift Goals  Clinical Goals: rest    Progress made toward(s) clinical / shift goals:    Problem: Knowledge Deficit - Standard  Goal: Patient and family/care givers will demonstrate understanding of plan of care, disease process/condition, diagnostic tests and medications  Outcome: Progressing     Problem: Fluid Volume  Goal: Fluid volume balance will be maintained  Outcome: Progressing     Problem: Respiratory  Goal: Patient will achieve/maintain optimum respiratory ventilation and gas exchange  Outcome: Progressing     Problem: Skin Integrity  Goal: Skin integrity is maintained or improved  Outcome: Progressing

## 2021-06-22 NOTE — CARE PLAN
The patient is Stable - Low risk of patient condition declining or worsening         Progress made toward(s) clinical / shift goals:        Problem: Knowledge Deficit - Standard  Goal: Patient and family/care givers will demonstrate understanding of plan of care, disease process/condition, diagnostic tests and medications  Outcome: Progressing     Problem: Respiratory  Goal: Patient will achieve/maintain optimum respiratory ventilation and gas exchange  Outcome: Progressing     Problem: Skin Integrity  Goal: Skin integrity is maintained or improved  Outcome: Progressing       Patient is not progressing towards the following goals:

## 2021-06-22 NOTE — PROGRESS NOTES
Daily Progress Note:     Date of Service: 6/22/2021  Primary Team: [facility] Team   Attending: Aiden Weston D.O   Senior Resident: Dr. Zachery Adkins MD  Intern: Dr. ERIN Rapp MD  Contact:  [phone]    Chief Complaint:   Ground-level fall    Subjective:  Mr. Lewis is a 80-year-old man with past medical history remarkable for gout and hypertension who presented to the [facility] ED status post mechanical GLF, found to be septic secondary to LLE cellulitis, with A. fib with RVR, type II MI, ANGIE and acute hypoxic respiratory failure.  Patient reports that he has overextended his front wheel walker and fell onto his belly.  He did not hit his head, did not lose consciousness.      Consultants/Specialty:  Vascular surgery  Review of Systems:   Patient does not report any fevers or chills.  Denies chest pain, palpitation.  He does report shortness of breath, which is chronic.  No other complaints.  14 point review of system was conducted    Objective Data:   Physical Exam:   Vitals:   Temp:  [36 °C (96.8 °F)-37.4 °C (99.3 °F)] 36.3 °C (97.4 °F)  Pulse:  [] 91  Resp:  [18] 18  BP: (114-163)/(62-96) 114/71  SpO2:  [91 %-96 %] 93 %    General: Obese man laying in bed in no acute distress  HEENT: NC/AT.  PERRLA, EOMI.  External ear normal.  Nares patent, nonedematous nonerythematous.  Moist mucous membranes. Good dentition.  Trachea midline.   Neck: No JVP.  Carotid bruit could not be appreciated.  Nontender, nonnodular thyroid.  No anterior or posterior cervical, occipital, supraclavicular lymphadenopathy  Cardiovascular: PMI<2 cm at fifth costal space at midclavicular line.  No heaves appreciated.  No thrills.  Irregularly irregular tachycardia W/O M, R, G.  Pulmonary: Normal work of breathing.  Resonant to percussion.  CTAB, no wheezes, crackles, rhonchi heard in 10 lung fields  Abdomen: Obese, soft, nondistended.  Normoactive bowel sounds.  Nontender to percussion or palpation.  No hepatosplenomegaly  noted.  No fluid wave  Musculoskeletal: Normal tone and bulk.  Hemosiderin deposits noted bilaterally.  Edematous, erythematous left lower extremity, nonpurulent.  Skin: Warm and dry.  No rashes, bruises or lacerations noted  Neuro: Alert and oriented X4.  CN II-XII checked and intact.  5 out of 5 strength throughout.  DTR 2+ throughout.  FTN, HTS intact.  Sensation intact . negative Romberg.  Lymphatic: No edema or lymphadenopathy noted.  Psychiatric: Good mood congruent affect.  Thought form linear, content appropriate      Labs:   Recent Labs     06/20/21 2310 06/22/21  0202   WBC 26.3* 19.3*   RBC 4.83 4.78   HEMOGLOBIN 14.0 13.5*   HEMATOCRIT 44.4 44.1   MCV 91.9 92.3   MCH 29.0 28.2   RDW 50.5* 50.8*   PLATELETCT 148* 152*   MPV 9.8 10.5   NEUTSPOLYS 86.70* 73.90*   LYMPHOCYTES 6.30* 12.20*   MONOCYTES 7.00 12.20   EOSINOPHILS 0.00 1.70   BASOPHILS 0.00 0.00   RBCMORPHOLO Present Normal     Recent Labs     06/20/21 2310 06/22/21  0202   SODIUM 138 137   POTASSIUM 5.4 4.5   CHLORIDE 105 104   CO2 23 23   GLUCOSE 133* 95   BUN 28* 22     Lactic acid:  3.4 on admission -> 1.5  TSH/T4: 1.75/1.17  BNP: 253  Pro-Manpreet 0.15    Imaging:   ECG remarkable for sinus tachycardia with inferior infarct of indeterminate age  CT-CTA: Remarkable for curvilinear low-attenuation area along the superior lateral aortic arch.  (Suspicious for intramural hematoma and focal dissection of indeterminate age).  Low density suggests protracted chronicity    Mr. Lewis is a 80-year-old man with past medical history remarkable for gout and hypertension who [place] [facility] [place] post mechanical GLF, found to be septic secondary to LLE cellulitis, with A. 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    * Sepsis (HCC)- (present on admission)  Assessment & Plan  This is Sepsis Present on admission  SIRS criteria identified  on my evaluation include: Fever, with temperature greater than 101 deg F  Source is likely left lower extremity cellulitis  Sepsis protocol initiated  Fluid resuscitation ordered per protocol  IV antibiotics as appropriate for source of sepsis  While organ dysfunction may be noted elsewhere in this problem list or in the chart, degree of organ dysfunction does not meet CMS criteria for severe sepsis    S/P 30 ml/kg fluid resuscitation        Cellulitis- (present on admission)  Assessment & Plan  Cellulitis at LLE medial aspect to superior border of the malleolus  No obvious breakage of skin    -Unasyn discontinued 6/21  -Cont cefazolin 2 g every 8 hours for total of 5 days    A-fib (HCC)- (present on admission)  Assessment & Plan  New onset A. fib with RVR; now Afib without RVR.  Admitted with telemetry  Start carvedilol 6.25 mg twice daily  As needed enalaprilat 1.25  Continuer enoxaparin 150  Consider transition to rivaroxaban 20 pending improvement of ANGIE function  Cardiology consulted, appreciate [facility]'s      Chronic thoracic aortic dissection (HCC)  Assessment & Plan  Noted on CT-CTA at the superior lateral aortic arch  -Cardiovascular surgery consulted; do not recommend any acute interventions  -Follow-up as an outpatient upon discharge with cardiovascular surgery    Acute respiratory failure (HCC)- (present on admission)  Assessment & Plan  Requiring 2L nc on admission  RT  duonebs  Wheezing on auscultation   BNP ordered to assess  CT-CTA without evidence of PE      Morbidly obese (HCC)- (present on admission)  Assessment & Plan  BMI 47  OT/PT        Acute kidney injury (HCC)- (present on admission)  Assessment & Plan  Possibly acute on chronic kidney disease with current GFR of 35, BUN/creatinine 28/1.85  IV fluids and   Continue to monitor    Fall- (present on admission)  Assessment & Plan  Probable falls mechanical without loss of consciousness or injuries  Fall precautions in place  OT/PT to evaluate and  treat    Type 2 myocardial infarction (HCC)- (present on admission)  Assessment & Plan  Serial troponins -693-97, likely secondary to demand in the setting of A. fib with RVR  Repeated ECGs without signs of ischemia    Echo and stress test pending

## 2021-06-22 NOTE — PROGRESS NOTES
12 hour CC      Monitor Summary  Sinus Rhythm 85-80 down to 44 nonsustaining  R-O-F PACs/PVC, [place]  .18/.07/.36

## 2021-06-22 NOTE — CARE PLAN
Problem: Knowledge Deficit - Standard  Goal: Patient and family/care givers will demonstrate understanding of plan of care, disease process/condition, diagnostic tests and medications  Outcome: Progressing     Problem: Fluid Volume  Goal: Fluid volume balance will be maintained  Outcome: Progressing     Problem: Urinary - Renal Perfusion  Goal: Ability to achieve and maintain adequate renal perfusion and functioning will improve  Outcome: Progressing     Problem: Skin Integrity  Goal: Skin integrity is maintained or improved  Outcome: Progressing   The patient is Stable - Low risk of patient condition declining or worsening    Shift Goals  Clinical Goals: rest    Progress made toward(s) clinical / shift goals:  ambulate    Patient is not progressing towards the following goals:

## 2021-06-22 NOTE — PROGRESS NOTES
[facility] Cardiology Follow-up Note    Date of Service:    6/22/2021      Name:   David Lewis     YOB: 1940  Age:   80 y.o.  male   MRN:   [id number]      Chief Complaint: AFIB    HPI:  Mr Lewis is a 81 y/o wheelchair bound fellow with PMH including Essential hypertension, obesity.  He presented to [facility] on 6/21/21 after a mechanical fall.   He was found to be in new AFIB RVR, acute hypoxia, and ANGIE.  Troponin was mildly elevated with flat trend.    He was suspected to be septic from LLE cellulitis, now on IV abx.  R/o for PE with CTA, but incidentally found to have chronic appearing distal aortic arch dissection.  Dr. Bello was also consulted - he recommended continued out-pt surveillance.    Interim Events:  Converted to SR.  TTE is pending today.  He ambulated this morning, did well  He is off O2  Denies shortness of breath, but is dyspneic with exertion  Notes LE swelling    ROS  Constitutional:  + fatigue..  Respiratory:  Denies shortness of breath, no cough.  Cardiovascular:  No chest pain.  + lower extremity edema.  Denies orthopnea or PND.  : denies polyuria, no dysuria.  GI:  Denies nausea/vomiting.  No abdominal distention.  Neuro:  Denies dizziness, syncope.  Hem/lymph: Denies easy bleeding/bruising.      All other review of systems reviewed and negative.    Past medical, surgical, social, and family history reviewed and unchanged from admission except as noted in HPI.    Medications: Reviewed in MAR  Current Facility-Administered Medications   Medication Dose Frequency Provider Last Rate Last Admin   • senna-docusate (PERICOLACE or SENOKOT S) 8.6-50 MG per tablet 2 tablet  2 tablet BID Cari Kiran M.D.        And   • polyethylene glycol/lytes (MIRALAX) PACKET 1 Packet  1 Packet QDAY PRN Cari Kiran M.D.        And   • magnesium hydroxide (MILK OF MAGNESIA) suspension 30 mL  30 mL QDAY PRN Cari Kiran M.D.        And   • bisacodyl  "(DULCOLAX) suppository 10 mg  10 mg QDAY PRN Cari Kiran M.D.       • acetaminophen (Tylenol) tablet 650 mg  650 mg Q6HRS PRN Cari Kiran M.D.       • enalaprilat (VASOTEC) injection 1.25 mg  1.25 mg Q6HRS PRN Cari Kiran M.D.       • labetalol (NORMODYNE/TRANDATE) injection 10 mg  10 mg Q4HRS PRN Cari Kiran M.D.       • enoxaparin (LOVENOX) injection 150 mg  1 mg/kg Q12HRS Cari Kiran M.D.   150 mg at 06/22/21 0524   • aspirin EC (ECOTRIN) tablet 81 mg  81 mg DAILY Cari Kiran M.D.   81 mg at 06/22/21 0524   • ondansetron (ZOFRAN) syringe/vial injection 4 mg  4 mg Q4HRS PRN Cari Kiran M.D.       • ondansetron (ZOFRAN ODT) dispertab 4 mg  4 mg Q4HRS PRN Cari Kiran M.D.       • ipratropium-albuterol (DUONEB) nebulizer solution  3 mL Q4HRS (RT) Cari Kiran M.D.   3 mL at 06/21/21 1508   • Metoprolol Tartrate (LOPRESSOR) injection 5 mg  5 mg Q5 MIN PRN Cari Kiran M.D.       • ceFAZolin in dextrose (ANCEF) IVPB premix 2 g  2 g Q8HRS Margie Rapp M.D. 200 mL/hr at 06/22/21 0524 2 g at 06/22/21 0524   • carvedilol (COREG) tablet 6.25 mg  6.25 mg BID WITH MEALS Margie Rapp M.D.   6.25 mg at 06/21/21 1411   Last reviewed on 6/21/2021 12:43 AM by Frank DESHPANDE [employer], PhT    No Known Allergies    Physical Exam  Body mass index is 45.69 kg/m². /84   Pulse (!) 103   Temp 36.6 °C (97.8 °F) (Temporal)   Resp 18   Ht 1.803 m (5' 11\")   Wt (!) 149 kg (327 lb 9.7 oz)   SpO2 93%    Vitals:    06/21/21 1704 06/21/21 1903 06/21/21 2315 06/22/21 0408   BP: 129/78 120/62 129/96 154/84   Pulse: 82 82 73 (!) 103   Resp: 18 18 18 18   Temp: 36 °C (96.8 °F) 36.9 °C (98.4 °F) 36.8 °C (98.2 °F) 36.6 °C (97.8 °F)   TempSrc: Temporal Temporal Temporal Temporal   SpO2: 96% 94% 93% 93%   Weight:       Height:        Oxygen Therapy:  Pulse Oximetry: 93 %, O2 (LPM): 0, O2 Delivery Device: None - Room Air    General: no apparent distress  Neck: no significant  " JVD   Lungs: normal effort,  without crackles, no wheezing or rhonchi  Heart: normal rate, regular rhythm, no murmur, no rub  EXT: tight pitting edema and swelling to the RLE/foot.  1+ pitting edema to the LLE below the knee.  Abdomen: soft, non tender, non distended  Neurological: No focal deficits, no facial asymmetry.  Normal speech  Psychiatric: Appropriate affect, alert and oriented x 3  Skin: Warm extremities, no rash    Labs (personally reviewed):     Lab Results   Component Value Date/Time    SODIUM 137 2021 02:02 AM    POTASSIUM 4.5 2021 02:02 AM    CHLORIDE 104 2021 02:02 AM    CO2 23 2021 02:02 AM    GLUCOSE 95 2021 02:02 AM    BUN 22 2021 02:02 AM    CREATININE 1.61 (H) 2021 02:02 AM     Lab Results   Component Value Date/Time    ALKPHOSPHAT 71 2021 02:02 AM    ASTSGOT 50 (H) 2021 02:02 AM    ALTSGPT 20 2021 02:02 AM    TBILIRUBIN 0.5 2021 02:02 AM      Lab Results   Component Value Date/Time    CHOLSTRLTOT 116 2021 02:02 AM    LDL 61 2021 02:02 AM    HDL 38 (A) 2021 02:02 AM    TRIGLYCERIDE 85 2021 02:02 AM         Cardiac Imaging and Procedures Review:      Personal Telemetry Review: SR in the 90s.    CTA chest 21:  IMPRESSION:     1.  There is no CT evidence of acute pulmonary embolism.  2.  Curvilinear low-attenuation area along the superior lateral aortic arch suspicious for intramural hematoma and focal dissection, age indeterminate. The low density suggests this may be old blood.  3.  There is evidence of previous granulomatous inflammatory process.    Assessment and Medical Decision Makin   New paroxysmal atrial fibrillation with RVR  -    Converted to SR overnight  -    [id number] at least 4 (age> 75, vascular disease, htn) would recommend transition to NOAC prior to d/c (Eliquis 5 BID).   -    Also recommend ischemic evaluation with nuclear stress test, can plan for tomorrow if hospitalist  agreeable.  Keep NPO at midnight, no caffeine please.    2    Sepsis, secondary to LLE cellulitis  -     Treatment per primary team    3    Acute hypoxic resp failure  -    R/o for PE  -    No longer requiring supplemental O2.    4    Elevated troponin  -     Peaked at 117 with flat trend  -     Consider ischemic eval as noted above.  -     Echo pending    5     Chronic dissection of the aortic arch  -     BP control essential  -     CV surgery consulted, recommend continued out patient surveillance.    6    Essential hypertension   -     Recommend up-titrate BB to goal BP < 130/80    Will follow the results of echo and plan for stress test tomorrow.    Nicolasa Ng PA-C  [facility]

## 2021-06-22 NOTE — THERAPY
"Occupational Therapy   Initial Evaluation     Patient Name: David Lewis  Age:  80 y.o., Sex:  male  Medical Record #: [id number]  Today's Date: 6/22/2021    Precautions: Fall Risk    Assessment  Patient is 80 y.o. male with a GLF at home & diagnosised with Afib, sepsis and elevated tropnoin. Pt is presenting with SOB and CTA for PE incidentally showed what appears to be a focal dissection of the distal aortic arch. PMHx: including essential hypertension and obesity. Pt family was present and appears to have good support at home. Long discussion with pt & wife regarding AE for home use.  Pt would benefit from shower chair, sock aide & LH reacher.  Wife reports pt has become more sedentary over the past few years.  Pt also encouraged to look into out pt PT once he completes HH therapy.  Pt appears to near/ at functional baseline, not further acute OT needed. Pt may benefit from HH after d/c to ensure a safe transition to home environment.     Plan  Recommend Occupational Therapy for Evaluation only  DC Equipment Recommendations: Bariatric shower chair        Subjective  \"I just use my w/c around the house and watch netflix\"     Objective     06/22/21 1213   Initial Contact Note    Initial Contact Note Order Received and Verified, Evaluation Only - Patient Does Not Require Further Acute Occupational Therapy at this Time.  However, May Benefit from Post Acute Therapy for Higher Level Functional Deficits.   Prior Living Situation   Prior Services Home-Independent   Housing / Facility 1 Story House   Steps Into Home 1   Steps In Home 0   Elevator No   Bathroom Set up Grab Bars;Shower Glass Doors;Bathtub / Shower Combination  (pt stated there is a small step before getting the shower)   Equipment Owned FWW;Wheelchair;Grab Bar(s) In Tub / Shower;Raised Toilet Seat Without Arms;Hand Held Shower   Lives with - Patient's Self Care Capacity Spouse   Prior Level of ADL Function   Self Feeding Independent   Grooming / Hygiene " Independent   Bathing Independent  (pt stated he only neede help getting out of shower)   Dressing Independent  (pt stated he uses reacher to help with LE dressing)   Toileting Independent   Prior Level of IADL Function   Medication Management Independent  (pt's wife is a pharmamsist and helps as able)   Prior Level Of Mobility Independent With Device in Community   History of Falls   History of Falls Yes   Precautions   Precautions Fall Risk   Vitals   O2 (LPM) 0   O2 Delivery Device None - Room Air   Pain   Pain Scales 0 to 10 Scale    Intervention Declines   Balance Assessment   Sitting Balance (Static) Good   Sitting Balance (Dynamic) Fair +   Standing Balance (Static) Fair +   Standing Balance (Dynamic) Fair   Weight Shift Sitting Good   Weight Shift Standing Good   Comments w/FWW   Bed Mobility    Supine to Sit   (pt was seated up in chair with family before session)   Sit to Supine   (pt was left up in chair after session)   Scooting Supervised   ADL Assessment   Eating Supervision   Grooming Supervision   How much help from another person does the patient currently need...   6 Clicks Daily Activity Score 19   Functional Mobility   Sit to Stand Minimal Assist   Bed, Chair, Wheelchair Transfer Supervised   Activity Tolerance   Comments pt was presented near/funcitonal baseline with activties   Problem List   Problem List Impaired Posture / Trunk Alignment   Anticipated Discharge Equipment and Recommendations   DC Equipment Recommendations Grab Bar(s) by Toilet;Tub Transfer Bench   Interdisciplinary Plan of Care Collaboration   IDT Collaboration with  Nursing   Patient Position at End of Therapy Seated;Phone within Reach;Family / Friend in Room;Call Light within Reach;Tray Table within Reach   Collaboration Comments nag notified   Session Information   Date / Session Number  6/22 Eval Only   Priority 0

## 2021-06-23 ENCOUNTER — APPOINTMENT (OUTPATIENT)
Dept: RADIOLOGY | Facility: MEDICAL CENTER | Age: 81
DRG: 871 | End: 2021-06-23
Attending: PHYSICIAN ASSISTANT
Payer: MEDICARE

## 2021-06-23 LAB
ALBUMIN SERPL BCP-MCNC: 3.2 G/DL (ref 3.2–4.9)
ALBUMIN/GLOB SERPL: 1 G/DL
ALP SERPL-CCNC: 61 U/L (ref 30–99)
ALT SERPL-CCNC: 12 U/L (ref 2–50)
ANION GAP SERPL CALC-SCNC: 9 MMOL/L (ref 7–16)
ANISOCYTOSIS BLD QL SMEAR: ABNORMAL
AST SERPL-CCNC: 44 U/L (ref 12–45)
BASOPHILS # BLD AUTO: 0.9 % (ref 0–1.8)
BASOPHILS # BLD: 0.14 K/UL (ref 0–0.12)
BILIRUB SERPL-MCNC: 0.4 MG/DL (ref 0.1–1.5)
BUN SERPL-MCNC: 20 MG/DL (ref 8–22)
CALCIUM SERPL-MCNC: 9 MG/DL (ref 8.5–10.5)
CHLORIDE SERPL-SCNC: 106 MMOL/L (ref 96–112)
CO2 SERPL-SCNC: 21 MMOL/L (ref 20–33)
CREAT SERPL-MCNC: 1.54 MG/DL (ref 0.5–1.4)
EOSINOPHIL # BLD AUTO: 0.14 K/UL (ref 0–0.51)
EOSINOPHIL NFR BLD: 0.9 % (ref 0–6.9)
ERYTHROCYTE [DISTWIDTH] IN BLOOD BY AUTOMATED COUNT: 50.4 FL (ref 35.9–50)
GLOBULIN SER CALC-MCNC: 3.3 G/DL (ref 1.9–3.5)
GLUCOSE SERPL-MCNC: 91 MG/DL (ref 65–99)
HCT VFR BLD AUTO: 39.3 % (ref 42–52)
HGB BLD-MCNC: 12.3 G/DL (ref 14–18)
LYMPHOCYTES # BLD AUTO: 1.71 K/UL (ref 1–4.8)
LYMPHOCYTES NFR BLD: 11.3 % (ref 22–41)
MANUAL DIFF BLD: NORMAL
MCH RBC QN AUTO: 28.9 PG (ref 27–33)
MCHC RBC AUTO-ENTMCNC: 31.3 G/DL (ref 33.7–35.3)
MCV RBC AUTO: 92.5 FL (ref 81.4–97.8)
MONOCYTES # BLD AUTO: 1.71 K/UL (ref 0–0.85)
MONOCYTES NFR BLD AUTO: 11.3 % (ref 0–13.4)
MORPHOLOGY BLD-IMP: NORMAL
NEUTROPHILS # BLD AUTO: 11.42 K/UL (ref 1.82–7.42)
NEUTROPHILS NFR BLD: 75.6 % (ref 44–72)
NRBC # BLD AUTO: 0 K/UL
NRBC BLD-RTO: 0 /100 WBC
PLATELET # BLD AUTO: 151 K/UL (ref 164–446)
PLATELET BLD QL SMEAR: NORMAL
PMV BLD AUTO: 11.6 FL (ref 9–12.9)
POTASSIUM SERPL-SCNC: 4.4 MMOL/L (ref 3.6–5.5)
PROT SERPL-MCNC: 6.5 G/DL (ref 6–8.2)
RBC # BLD AUTO: 4.25 M/UL (ref 4.7–6.1)
RBC BLD AUTO: PRESENT
SODIUM SERPL-SCNC: 136 MMOL/L (ref 135–145)
WBC # BLD AUTO: 15.1 K/UL (ref 4.8–10.8)

## 2021-06-23 PROCEDURE — 700111 HCHG RX REV CODE 636 W/ 250 OVERRIDE (IP): Performed by: STUDENT IN AN ORGANIZED HEALTH CARE EDUCATION/TRAINING PROGRAM

## 2021-06-23 PROCEDURE — 36415 COLL VENOUS BLD VENIPUNCTURE: CPT

## 2021-06-23 PROCEDURE — 700102 HCHG RX REV CODE 250 W/ 637 OVERRIDE(OP): Performed by: PHYSICIAN ASSISTANT

## 2021-06-23 PROCEDURE — A9270 NON-COVERED ITEM OR SERVICE: HCPCS | Performed by: PHYSICIAN ASSISTANT

## 2021-06-23 PROCEDURE — 80053 COMPREHEN METABOLIC PANEL: CPT

## 2021-06-23 PROCEDURE — 85007 BL SMEAR W/DIFF WBC COUNT: CPT

## 2021-06-23 PROCEDURE — 85027 COMPLETE CBC AUTOMATED: CPT

## 2021-06-23 PROCEDURE — 700102 HCHG RX REV CODE 250 W/ 637 OVERRIDE(OP): Performed by: STUDENT IN AN ORGANIZED HEALTH CARE EDUCATION/TRAINING PROGRAM

## 2021-06-23 PROCEDURE — 99232 SBSQ HOSP IP/OBS MODERATE 35: CPT | Performed by: INTERNAL MEDICINE

## 2021-06-23 PROCEDURE — 99232 SBSQ HOSP IP/OBS MODERATE 35: CPT | Mod: GC | Performed by: STUDENT IN AN ORGANIZED HEALTH CARE EDUCATION/TRAINING PROGRAM

## 2021-06-23 PROCEDURE — A9502 TC99M TETROFOSMIN: HCPCS

## 2021-06-23 PROCEDURE — 700111 HCHG RX REV CODE 636 W/ 250 OVERRIDE (IP)

## 2021-06-23 PROCEDURE — 770020 HCHG ROOM/CARE - TELE (206)

## 2021-06-23 PROCEDURE — A9270 NON-COVERED ITEM OR SERVICE: HCPCS | Performed by: STUDENT IN AN ORGANIZED HEALTH CARE EDUCATION/TRAINING PROGRAM

## 2021-06-23 RX ORDER — REGADENOSON 0.08 MG/ML
0.4 INJECTION, SOLUTION INTRAVENOUS ONCE
Status: COMPLETED | OUTPATIENT
Start: 2021-06-23 | End: 2021-06-23

## 2021-06-23 RX ORDER — REGADENOSON 0.08 MG/ML
INJECTION, SOLUTION INTRAVENOUS
Status: COMPLETED
Start: 2021-06-23 | End: 2021-06-23

## 2021-06-23 RX ADMIN — CEFAZOLIN SODIUM 2 G: 2 INJECTION, SOLUTION INTRAVENOUS at 14:16

## 2021-06-23 RX ADMIN — CEFAZOLIN SODIUM 2 G: 2 INJECTION, SOLUTION INTRAVENOUS at 04:55

## 2021-06-23 RX ADMIN — ASPIRIN 81 MG: 81 TABLET, COATED ORAL at 04:54

## 2021-06-23 RX ADMIN — APIXABAN 5 MG: 5 TABLET, FILM COATED ORAL at 17:21

## 2021-06-23 RX ADMIN — CEFAZOLIN SODIUM 2 G: 2 INJECTION, SOLUTION INTRAVENOUS at 22:02

## 2021-06-23 RX ADMIN — REGADENOSON 0.4 MG: 0.08 INJECTION, SOLUTION INTRAVENOUS at 08:45

## 2021-06-23 RX ADMIN — ENOXAPARIN SODIUM 150 MG: 150 INJECTION SUBCUTANEOUS at 04:55

## 2021-06-23 ASSESSMENT — PAIN DESCRIPTION - PAIN TYPE: TYPE: ACUTE PAIN

## 2021-06-23 NOTE — FACE TO FACE
Face to Face Note  -  Durable Medical Equipment    Margie Rapp M.D. - NPI: [id number]  I certify that this patient is under my care and that they had a durable medical equipment(DME)face to face encounter by myself and the clinical nurse specialist working collaboratively with me that meets the physician DME face-to-face encounter requirements with this patient on:    Date of encounter:   Patient:                    MRN:                       YOB: 2019  David Lewis  [id number]  1940     The encounter with the patient was in whole, or in part, for the following medical condition, which is the primary reason for durable medical equipment:  Other - Obesity    I certify that, based on my findings, the following durable medical equipment is medically necessary:  Other DME Equipment - Bariatric shower chair.    HOME O2 Saturation Measurements:(Values must be present for Home Oxygen orders)         ,     ,         My Clinical findings support the need for the above equipment due to:  Other - Poor stability 2/2 obesity    Supporting Symptoms: Difficulty with recovery from unblanced position    If patient feels more short of breath, they can go up to 6 liters per minute and contact healthcare provider.

## 2021-06-23 NOTE — CARE PLAN
Problem: Knowledge Deficit - Standard  Goal: Patient and family/care givers will demonstrate understanding of plan of care, disease process/condition, diagnostic tests and medications  Outcome: Progressing     Problem: Skin Integrity  Goal: Skin integrity is maintained or improved  Outcome: Progressing   The patient is Stable - Low risk of patient condition declining or worsening    Shift Goals  Clinical Goals: Pt will be weaned off of oxygen.   Patient Goals: Pt will get to go home.  Family Goals: N/A

## 2021-06-23 NOTE — PROGRESS NOTES
[facility] Cardiology Follow-up Note    Date of Service:    6/23/2021      Name:   David Lewis     YOB: 1940  Age:   80 y.o.  male   MRN:   [id number]      Chief Complaint: AFIB    HPI:  Mr Lewis is a 79 y/o wheelchair bound fellow with PMH including Essential hypertension, obesity.  He presented to [facility] on 6/21/21 after a mechanical fall.   He was found to be in new AFIB RVR, acute hypoxia, and ANGIE.  Troponin was mildly elevated with flat trend.    He was suspected to be septic from LLE cellulitis, now on IV abx.  R/o for PE with CTA, but incidentally found to have chronic appearing distal aortic arch dissection.  Dr. Bello was also consulted - he recommended continued out-pt surveillance.    Interim Events:  He is doing quite well this morning  Denies much pain to the L foot.  He is ambulatory without chest pain, feels dyspneic with exertion.    ROS  Constitutional:  + fatigue..  Respiratory:  Denies shortness of breath, no cough.  Cardiovascular:  No chest pain.  + lower extremity edema.  Denies orthopnea or PND.  : denies polyuria, no dysuria.  GI:  Denies nausea/vomiting.  No abdominal distention.  Neuro:  Denies dizziness, syncope.  Hem/lymph: Denies easy bleeding/bruising.      All other review of systems reviewed and negative.    Past medical, surgical, social, and family history reviewed and unchanged from admission except as noted in HPI.    Medications: Reviewed in MAR  Current Facility-Administered Medications   Medication Dose Frequency Provider Last Rate Last Admin   • ipratropium-albuterol (DUONEB) nebulizer solution  3 mL Q4H PRN (RT) Aiden Weston D.O.       • senna-docusate (PERICOLACE or SENOKOT S) 8.6-50 MG per tablet 2 tablet  2 tablet BID Cari Kiran M.D.        And   • polyethylene glycol/lytes (MIRALAX) PACKET 1 Packet  1 Packet QDAY PRN Cari Kiran M.D.        And   • magnesium hydroxide (MILK OF MAGNESIA) suspension 30 mL   "30 mL QDAY PRN Cari Kiran M.D.        And   • bisacodyl (DULCOLAX) suppository 10 mg  10 mg QDAY PRN Cari Kiran M.D.       • acetaminophen (Tylenol) tablet 650 mg  650 mg Q6HRS PRN Cari Kiran M.D.       • enalaprilat (VASOTEC) injection 1.25 mg  1.25 mg Q6HRS PRN Cari Kiran M.D.       • labetalol (NORMODYNE/TRANDATE) injection 10 mg  10 mg Q4HRS PRN Cari Kiran M.D.       • enoxaparin (LOVENOX) injection 150 mg  1 mg/kg Q12HRS Cari Kiran M.D.   150 mg at 06/23/21 0455   • aspirin EC (ECOTRIN) tablet 81 mg  81 mg DAILY Cari Kiran M.D.   81 mg at 06/23/21 0454   • ondansetron (ZOFRAN) syringe/vial injection 4 mg  4 mg Q4HRS PRN Cari Kiran M.D.       • ondansetron (ZOFRAN ODT) dispertab 4 mg  4 mg Q4HRS PRN Cari Kiran M.D.       • Metoprolol Tartrate (LOPRESSOR) injection 5 mg  5 mg Q5 MIN PRN Cari Kiran M.D.       • ceFAZolin in dextrose (ANCEF) IVPB premix 2 g  2 g Q8HRS Margie Rapp M.D.   Stopped at 06/23/21 0525   • carvedilol (COREG) tablet 6.25 mg  6.25 mg BID WITH MEALS Margie Rapp M.D.   6.25 mg at 06/22/21 0021   Last reviewed on 6/21/2021 12:43 AM by Frank DESHPANDE [employer], PhT    No Known Allergies    Physical Exam  Body mass index is 45.69 kg/m². /89   Pulse 95   Temp 36.4 °C (97.5 °F) (Temporal)   Resp 18   Ht 1.803 m (5' 11\")   Wt (!) 149 kg (327 lb 9.7 oz)   SpO2 95%    Vitals:    06/22/21 2032 06/23/21 0014 06/23/21 0510 06/23/21 0715   BP: 119/88 114/91 112/81 137/89   Pulse:  81 86 95   Resp:  18 16 18   Temp:  36.1 °C (97 °F) 36.2 °C (97.2 °F) 36.4 °C (97.5 °F)   TempSrc:  Temporal Temporal Temporal   SpO2:  97% 96% 95%   Weight:       Height:        Oxygen Therapy:  Pulse Oximetry: 95 %, O2 (LPM): 3, O2 Delivery Device: None - Room Air    General: no apparent distress  Neck: no significant  JVD   Lungs: normal effort,  without crackles, no wheezing or rhonchi  Heart: normal rate, regular rhythm, no murmur, " no rub  EXT: 1+ BLE pitting edema.  Abdomen: soft, non tender, non distended  Neurological: No focal deficits, no facial asymmetry.  Normal speech  Psychiatric: Appropriate affect, alert and oriented x 3  Skin: Warm extremities, no rash    Labs (personally reviewed):     Lab Results   Component Value Date/Time    SODIUM 136 06/23/2021 02:49 AM    POTASSIUM 4.4 06/23/2021 02:49 AM    CHLORIDE 106 06/23/2021 02:49 AM    CO2 21 06/23/2021 02:49 AM    GLUCOSE 91 06/23/2021 02:49 AM    BUN 20 06/23/2021 02:49 AM    CREATININE 1.54 (H) 06/23/2021 02:49 AM     Lab Results   Component Value Date/Time    ALKPHOSPHAT 61 06/23/2021 02:49 AM    ASTSGOT 44 06/23/2021 02:49 AM    ALTSGPT 12 06/23/2021 02:49 AM    TBILIRUBIN 0.4 06/23/2021 02:49 AM      Lab Results   Component Value Date/Time    CHOLSTRLTOT 116 06/22/2021 02:02 AM    LDL 61 06/22/2021 02:02 AM    HDL 38 (A) 06/22/2021 02:02 AM    TRIGLYCERIDE 85 06/22/2021 02:02 AM         Cardiac Imaging and Procedures Review:      Personal Telemetry Review: SR in the 90s.    CTA chest 6/21/21:  IMPRESSION:     1.  There is no CT evidence of acute pulmonary embolism.  2.  Curvilinear low-attenuation area along the superior lateral aortic arch suspicious for intramural hematoma and focal dissection, age indeterminate. The low density suggests this may be old blood.  3.  There is evidence of previous granulomatous inflammatory process.    Stress test 6/23/21:  NUCLEAR IMAGING INTERPRETATION   No evidence of significant jeopardized viable myocardium or prior myocardial    infarction.   Normal left ventricular size, ejection fraction, and wall motion.    Echo 6/22/21:  CONCLUSIONS  Normal left ventricular systolic function.  Left ventricular ejection fraction is visually estimated to be 60-65%.  Normal right ventricular size and systolic function.  No significant valve abnormalities.   Right heart pressures are consistent with moderate pulmonary   hypertension.  No prior study is  available for comparison.       Assessment and Medical Decision Makin   New paroxysmal atrial fibrillation with RVR  -    Converted to SR overnight  -    [id number] at least 4 (age> 75, vascular disease, htn) would recommend transition to NOAC prior to d/c (Eliquis 5 BID).  Will ask social work to assure he can afford this.  -    No ischemia on stress test today, preserved EF on echo.    2    Sepsis, secondary to LLE cellulitis  -     Treatment per primary team    3    Acute hypoxic resp failure  -    R/o for PE  -    No longer requiring supplemental O2.    4    Elevated troponin  -     Peaked at 117 with flat trend  -     Stress test without ischemic, EF normal on echo.    5     Chronic dissection of the aortic arch  -     BP control essential  -     CV surgery consulted, recommend continued out patient surveillance.    6    Essential hypertension   -     Recommend up-titrate BB to goal BP < 130/80    Cardiology will sign off at this time.  Please contact our service directly with further questions/concerns.      Nicolasa Ng PA-C  [facility]

## 2021-06-23 NOTE — CARE PLAN
The patient is Stable - Low risk of patient condition declining or worsening    Shift Goals  Clinical Goals: chemical stress test tomorrow  Patient Goals: rest, comfort    Progress made toward(s) clinical / shift goals:    Problem: Knowledge Deficit - Standard  Goal: Patient and family/care givers will demonstrate understanding of plan of care, disease process/condition, diagnostic tests and medications  Outcome: Progressing     Problem: Respiratory  Goal: Patient will achieve/maintain optimum respiratory ventilation and gas exchange  Outcome: Progressing     Problem: Skin Integrity  Goal: Skin integrity is maintained or improved  Outcome: Progressing

## 2021-06-24 LAB
BACTERIA BLD CULT: ABNORMAL
BASOPHILS # BLD AUTO: 0.5 % (ref 0–1.8)
BASOPHILS # BLD: 0.07 K/UL (ref 0–0.12)
EKG IMPRESSION: NORMAL
EOSINOPHIL # BLD AUTO: 0.17 K/UL (ref 0–0.51)
EOSINOPHIL NFR BLD: 1.2 % (ref 0–6.9)
ERYTHROCYTE [DISTWIDTH] IN BLOOD BY AUTOMATED COUNT: 47.5 FL (ref 35.9–50)
HCT VFR BLD AUTO: 41.4 % (ref 42–52)
HGB BLD-MCNC: 13.3 G/DL (ref 14–18)
IMM GRANULOCYTES # BLD AUTO: 0.08 K/UL (ref 0–0.11)
IMM GRANULOCYTES NFR BLD AUTO: 0.6 % (ref 0–0.9)
LYMPHOCYTES # BLD AUTO: 1.79 K/UL (ref 1–4.8)
LYMPHOCYTES NFR BLD: 12.5 % (ref 22–41)
MCH RBC QN AUTO: 28.7 PG (ref 27–33)
MCHC RBC AUTO-ENTMCNC: 32.1 G/DL (ref 33.7–35.3)
MCV RBC AUTO: 89.4 FL (ref 81.4–97.8)
MONOCYTES # BLD AUTO: 1.87 K/UL (ref 0–0.85)
MONOCYTES NFR BLD AUTO: 13 % (ref 0–13.4)
NEUTROPHILS # BLD AUTO: 10.36 K/UL (ref 1.82–7.42)
NEUTROPHILS NFR BLD: 72.2 % (ref 44–72)
NRBC # BLD AUTO: 0 K/UL
NRBC BLD-RTO: 0 /100 WBC
PLATELET # BLD AUTO: 179 K/UL (ref 164–446)
PMV BLD AUTO: 12.1 FL (ref 9–12.9)
RBC # BLD AUTO: 4.63 M/UL (ref 4.7–6.1)
SIGNIFICANT IND 70042: ABNORMAL
SIGNIFICANT IND 70042: ABNORMAL
SITE SITE: ABNORMAL
SITE SITE: ABNORMAL
SOURCE SOURCE: ABNORMAL
SOURCE SOURCE: ABNORMAL
WBC # BLD AUTO: 14.3 K/UL (ref 4.8–10.8)

## 2021-06-24 PROCEDURE — 700102 HCHG RX REV CODE 250 W/ 637 OVERRIDE(OP): Performed by: STUDENT IN AN ORGANIZED HEALTH CARE EDUCATION/TRAINING PROGRAM

## 2021-06-24 PROCEDURE — A9270 NON-COVERED ITEM OR SERVICE: HCPCS | Performed by: STUDENT IN AN ORGANIZED HEALTH CARE EDUCATION/TRAINING PROGRAM

## 2021-06-24 PROCEDURE — 93005 ELECTROCARDIOGRAM TRACING: CPT | Performed by: STUDENT IN AN ORGANIZED HEALTH CARE EDUCATION/TRAINING PROGRAM

## 2021-06-24 PROCEDURE — 36415 COLL VENOUS BLD VENIPUNCTURE: CPT

## 2021-06-24 PROCEDURE — 700102 HCHG RX REV CODE 250 W/ 637 OVERRIDE(OP): Performed by: PHYSICIAN ASSISTANT

## 2021-06-24 PROCEDURE — 700111 HCHG RX REV CODE 636 W/ 250 OVERRIDE (IP): Performed by: STUDENT IN AN ORGANIZED HEALTH CARE EDUCATION/TRAINING PROGRAM

## 2021-06-24 PROCEDURE — 85025 COMPLETE CBC W/AUTO DIFF WBC: CPT

## 2021-06-24 PROCEDURE — 770006 HCHG ROOM/CARE - MED/SURG/GYN SEMI*

## 2021-06-24 PROCEDURE — A9270 NON-COVERED ITEM OR SERVICE: HCPCS | Performed by: PHYSICIAN ASSISTANT

## 2021-06-24 PROCEDURE — 93010 ELECTROCARDIOGRAM REPORT: CPT | Performed by: INTERNAL MEDICINE

## 2021-06-24 PROCEDURE — 700101 HCHG RX REV CODE 250: Performed by: STUDENT IN AN ORGANIZED HEALTH CARE EDUCATION/TRAINING PROGRAM

## 2021-06-24 PROCEDURE — 99223 1ST HOSP IP/OBS HIGH 75: CPT | Performed by: INTERNAL MEDICINE

## 2021-06-24 PROCEDURE — 99232 SBSQ HOSP IP/OBS MODERATE 35: CPT | Mod: GC | Performed by: STUDENT IN AN ORGANIZED HEALTH CARE EDUCATION/TRAINING PROGRAM

## 2021-06-24 RX ADMIN — LABETALOL HYDROCHLORIDE 10 MG: 5 INJECTION, SOLUTION INTRAVENOUS at 01:33

## 2021-06-24 RX ADMIN — CEFAZOLIN SODIUM 2 G: 2 INJECTION, SOLUTION INTRAVENOUS at 05:09

## 2021-06-24 RX ADMIN — CARVEDILOL 6.25 MG: 6.25 TABLET, FILM COATED ORAL at 08:37

## 2021-06-24 RX ADMIN — CARVEDILOL 6.25 MG: 6.25 TABLET, FILM COATED ORAL at 17:10

## 2021-06-24 RX ADMIN — CEFAZOLIN SODIUM 2 G: 2 INJECTION, SOLUTION INTRAVENOUS at 13:33

## 2021-06-24 RX ADMIN — APIXABAN 5 MG: 5 TABLET, FILM COATED ORAL at 05:08

## 2021-06-24 RX ADMIN — APIXABAN 5 MG: 5 TABLET, FILM COATED ORAL at 17:10

## 2021-06-24 RX ADMIN — CEFAZOLIN SODIUM 2 G: 2 INJECTION, SOLUTION INTRAVENOUS at 21:28

## 2021-06-24 ASSESSMENT — LIFESTYLE VARIABLES
TOTAL SCORE: 0
CONSUMPTION TOTAL: NEGATIVE
ALCOHOL_USE: NO
TOTAL SCORE: 0
HAVE YOU EVER FELT YOU SHOULD CUT DOWN ON YOUR DRINKING: NO
HAVE PEOPLE ANNOYED YOU BY CRITICIZING YOUR DRINKING: NO
TOTAL SCORE: 0
AVERAGE NUMBER OF DAYS PER WEEK YOU HAVE A DRINK CONTAINING ALCOHOL: 0
EVER FELT BAD OR GUILTY ABOUT YOUR DRINKING: NO
DOES PATIENT WANT TO STOP DRINKING: NO
HOW MANY TIMES IN THE PAST YEAR HAVE YOU HAD 5 OR MORE DRINKS IN A DAY: 0
ON A TYPICAL DAY WHEN YOU DRINK ALCOHOL HOW MANY DRINKS DO YOU HAVE: 0
EVER HAD A DRINK FIRST THING IN THE MORNING TO STEADY YOUR NERVES TO GET RID OF A HANGOVER: NO

## 2021-06-24 ASSESSMENT — CHA2DS2 SCORE
DIABETES: NO
PRIOR STROKE OR TIA OR THROMBOEMBOLISM: NO
AGE 65 TO 74: NO
SEX: MALE
CHF OR LEFT VENTRICULAR DYSFUNCTION: NO
HYPERTENSION: YES
AGE 75 OR GREATER: YES
CHA2DS2 VASC SCORE: 4
VASCULAR DISEASE: YES

## 2021-06-24 NOTE — CARE PLAN
The patient is Stable - Low risk of patient condition declining or worsening    Shift Goals  Clinical Goals: Pt will be weaned off o2  Patient Goals: go home  Family Goals: N/A    Progress made toward(s) clinical / shift goals:    Problem: Knowledge Deficit - Standard  Goal: Patient and family/care givers will demonstrate understanding of plan of care, disease process/condition, diagnostic tests and medications  Outcome: Progressing     Problem: Fluid Volume  Goal: Fluid volume balance will be maintained  Outcome: Progressing     Problem: Urinary - Renal Perfusion  Goal: Ability to achieve and maintain adequate renal perfusion and functioning will improve  Outcome: Progressing     Problem: Respiratory  Goal: Patient will achieve/maintain optimum respiratory ventilation and gas exchange  Outcome: Progressing     Problem: Skin Integrity  Goal: Skin integrity is maintained or improved  Outcome: Progressing

## 2021-06-24 NOTE — FACE TO FACE
Face to Face Supporting Documentation - Home Health    The encounter with this patient was in whole or in part the primary reason for home health admission.    Date of encounter:   Patient:                    MRN:                       YOB: 2019  David Lewis  [id number]  1940     Home health to see patient for:  Physical Therapy evaluation and treatment    Skilled need for:  Comment: IV infusion Abx    Skilled nursing interventions to include:  Venous access care and Home IV infusion therapy    Homebound status evidenced by:  Need the aid of supportive devices such as crutches, canes, wheelchairs or walkers or Needs the assistance of another person in order to leave the home. Leaving home requires a considerable and taxing effort. There is a normal inability to leave the home.    Community Physician to provide follow up care: Pcp Pt States None     Optional Interventions? No      I certify the face to face encounter for this home health care referral meets the CMS requirements and the encounter/clinical assessment with the patient was, in whole, or in part, for the medical condition(s) listed above, which is the primary reason for home health care. Based on my clinical findings: the service(s) are medically necessary, support the need for home health care, and the homebound criteria are met.  I certify that this patient has had a face to face encounter by myself.  Zachery Adkins M.D. - NPI: [id number]

## 2021-06-24 NOTE — CONSULTS
DATE OF SERVICE:  06/24/2021     INFECTIOUS DISEASE CONSULTATION     REASON FOR CONSULTATION:  Staph luanastaciounensis sepsis.     CONSULTING PHYSICIAN:  Zachery Jarquin     HISTORY OF PRESENT ILLNESS:  This is a very pleasant 80-year-old male who was   originally admitted to the hospital on 06/20/2021 after a ground level fall.    The fall happened on the day of admission. The patient had been walking to his   bathroom with his walker and stated that he had put it too far out in front   of him and as a result he fell forward.  He denied any antecedent near   syncope, dizziness, visual changes, chest pain, palpitations or other   complaints.  EMS was called and they noted that he had a fever of 101.2.  The   patient states he did not know that he had been having fever.  In the ED, he   was noted to have significant tachycardia with heart rate of 140, elevated   troponin, elevated lactic acidosis, and acute kidney injury with a creatinine   of 1.8 with significant leukocytosis of 26,000.  EKG showed AFib with rapid   ventricular response.  Blood cultures were done initially showed Staph epi,   coag-negative staph, now identified as luanastaciounensis.  He is currently receiving   Ancef and infectious disease is consulted for antibiotic recommendations and   management.     REVIEW OF SYSTEMS:  Shows resolution of his fever.  He is no longer   tachycardic.  He states he feels ready to go home.  All other systems are   reviewed and are negative.     ALLERGIES:  He has no known drug allergies.     PAST MEDICAL HISTORY:  Hypertension, on lisinopril.  He is not on   anticoagulation.     PAST SURGICAL HISTORY:  Knee replacements.     FAMILY HISTORY:  Diabetes and heart disease.     SOCIAL HISTORY:  He is a nonsmoker, does not drink or use illicit drugs.     PHYSICAL EXAMINATION:  GENERAL:  He is a well-nourished, well-developed male, sitting on the side of   the bed, in no acute distress.  VITAL SIGNS:  He has been afebrile since admission.   Current temperature 96.6,   blood pressure 121/85, heart rate of 91, respiratory rate 18, oxygen   saturation 91-94% on room air.  He is 5 feet 11 inches, weighs 149 kilos, BMI   of 45.69.  HEENT:  Normocephalic, atraumatic.  Pupils equal, round, reactive to light.    Extraocular movements intact.  Oropharynx is clear.  NECK:  Supple.  There is no JVD or stridor.  CARDIOVASCULAR:  Irregular rate and rhythm, unable to auscultate murmurs, rubs   or gallops.  CHEST:  Grossly clear to auscultation bilaterally, unlabored.  There is no   wheezing or rhonchi.  ABDOMEN:  Soft, nontender, nondistended.  There is no rebound or guarding.  EXTREMITIES:  Show no cyanosis or clubbing.  He does have some dependent   edema.  There is no joint swelling.  Review of media shows he did have a small   toe laceration on the plantar surface of his right great toe with overlying   eschar.  NEUROLOGIC:  He is awake, alert and oriented.  Speech is fluent without   dysarthria.  Cranial nerves are intact.  He is moving all extremities.  PSYCHIATRIC:  Mood is normal.  Affect is normal.     CURRENT LABS:  Show white blood cell count on admission of 26.  Current white   blood cell of 14.3, H and H are 13.3 and 41, platelets 179.  Sodium 136,   potassium 4.4, chloride 106, bicarb 21, glucose 91, BUN 20, creatinine 1.54.    AST 44, ALT 12, alk phos 61.  Troponin was up to 117, now 97.  Urinalysis was   remarkable for small amount of protein.  COVID was negative.  Procalcitonin   was normal.  Blood cultures x2 are positive for coagulase-negative staph. The   finding of lugdunensis and Staph epidermidis, Staph lugdunensis.  Repeat blood   cultures are negative on 06/21 and 06/22.     DIAGNOSTIC DATA:  CT angiogram of the chest showed no PE.  Chest x-ray showed   no pneumonia.  Transthoracic echocardiogram showed moderate pulmonary   hypertension.  EKG showed QTc of 434.     ASSESSMENT AND PLAN:  A 80-year-old male admitted after a ground level fall,    found to be septic with atrial fibrillation with rapid ventricular response,   NSTEMI versus demand ischemia.  Initial blood cultures showed multiple strains   of Staph epidermidis, coag-negative staph but unfortunately showed Staph   lugdunensis.  Cannot dismiss the finding of Staph lugdunensis as this organism   is treated as Staphylococcus aureus, so he will require four weeks of IV   antibiotic therapy from the date of his negative blood cultures.  Initial   reported left lower extremity cellulitis is potential source.  Due to the   rapid clearance of his blood cultures, transesophageal echocardiogram has been   deferred due to COVID protocol.  His leukocytosis is resolving.  He is okay   for midline placement.  He should continue on the IV Ancef and postop day   07/19/2021.  If he is not a candidate for home infusion due to insurance can   use once daily ertapenem or daptomycin through the [facility].    His initial AFib with rapid ventricular response, I did have again the NSTEMI.    He has been seen and evaluated by cardiology.  He is currently rate   controlled.  He was noted incidentally on the CT and chest to have a chronic   thoracic aortic dissection.  Cardiothoracic surgery was consulted and no intervention is recommended.  He did have acute kidney injury.  Renal function has improved since admission.  He is continuing on hydration with adjustment of antibiotics as needed.    Further recommendations per clinical course.  Orders were done.     Thank you and we will follow with you as needed.        ______________________________  MD ELINOR Youngblood/ORAL    DD:  06/24/2021 15:27  DT:  06/24/2021 16:57    Job#:  [id number]

## 2021-06-24 NOTE — PROGRESS NOTES
Monitor summary:  Sinus rhythm-sinus tachycardia   Rare PVC's, bigem, couplets, triplets   Frequent PAC's   1.4 and 1.6 second pauses, Cardiology notified  0.17/0.08/0.32

## 2021-06-24 NOTE — PROGRESS NOTES
Assumed care of PT A&O 4. Pt resting in bed with no signs of labored breathing. On 3L. Tele monitor in place, cardiac rhythm being monitored. Call light within reach, bed in lowest position, upper bed rails up. Pt was updated on plan of care for the day. Will continue to monitor.

## 2021-06-24 NOTE — PROGRESS NOTES
Daily Progress Note:     Date of Service: 6/24/2021  Primary Team: [facility]   Attending: Paulino Laughlin M.D.   Senior Resident: Dr. Adkins  Intern: Dr. Rapp  Contact:  [phone]    Chief Complaint:   Ground-level fall    Subjective:  Mr. Lewis is a 80-year-old man with past medical history remarkable for gout and hypertension who presented to the [facility] ED status post mechanical GLF, found to be septic secondary to LLE cellulitis, with A. fib with RVR, type II MI, ANGIE and acute hypoxic respiratory failure. Patient reports that he has overextended his front wheel walker and fell onto his belly.  He did not hit his head, did not lose consciousness    Consultants/Specialty:  Vascular surgery  Review of Systems:      ROS  All are negative except written in the Subjective    Objective Data:   Physical Exam:   Vitals:   Temp:  [36.3 °C (97.4 °F)-36.6 °C (97.8 °F)] 36.5 °C (97.7 °F)  Pulse:  [] 64  Resp:  [17-18] 18  BP: ()/(55-94) 132/88  SpO2:  [90 %-94 %] 92 %     Physical Exam  General: Obese man laying in bed in no acute distress  HEENT: NC/AT.  PERRLA, EOMI.  External ear normal.  Nares patent, nonedematous nonerythematous.  Moist mucous membranes. Good dentition.  Trachea midline.   Neck: No JVP.  Carotid bruit could not be appreciated.  Nontender, nonnodular thyroid.  No anterior or posterior cervical, occipital, supraclavicular lymphadenopathy  Cardiovascular: PMI<2 cm at fifth costal space at midclavicular line.  No heaves appreciated.  No thrills.  Irregularly irregular tachycardia W/O M, R, G.  Pulmonary: Normal work of breathing.  Resonant to percussion.  CTAB, no wheezes, crackles, rhonchi heard in 10 lung fields  Abdomen: Obese, soft, nondistended.  Normoactive bowel sounds.  Nontender to percussion or palpation.  No hepatosplenomegaly noted.  No fluid wave  Musculoskeletal: Normal tone and bulk.  Hemosiderin deposits noted bilaterally.  Edematous, erythematous left lower extremity,  nonpurulent.  Skin: Warm and dry.  No rashes, bruises or lacerations noted  Neuro: Alert and oriented X4.  CN II-XII checked and intact.  5 out of 5 strength throughout.  DTR 2+ throughout.  FTN, HTS intact.  Sensation intact . negative Romberg.  Lymphatic: No edema or lymphadenopathy noted.  Psychiatric: Good mood congruent affect.  Thought form linear, content appropriate       Labs:   WBC ct 14.3  Hgb 13.3  Creatinine 1.54    6/22/2021 blood cx: NGTD  6/21/2021 bld cx: NGTD  6/20/202 2 of 2 blood cx bottle: gram positive cocci: likely Staph specie. Pending speciation:    Imaging:   No new imaging    Problem Representation:    Mr. Lewis is a 80-year-old man with past medical history remarkable for gout and hypertension who presented to the [facility] ED status post mechanical GLF, found to be septic secondary to LLE cellulitis, with A. 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 Stress test not showing ischemic cardiomyopathy.       Patient likely will need atleast  IV abx treatment for setting up infusion, but consulting ID.   Ruling out Staph ludegenesis and Staph Aureus which requires atleast IV treatment.  Stress test negative; transitioning to rivaroxaban from Eloquis 5mg BID due to cost.     Update  Infectious disease consulted for S. ludegenesis bacteremia with b/l TKR.       * Sepsis (HCC)- (present on admission)  Assessment & Plan  This is Sepsis Present on admission  SIRS criteria identified on my evaluation include: Fever, with temperature greater than 101 deg F  Source is likely left lower extremity cellulitis  Sepsis protocol initiated  Fluid resuscitation ordered per protocol  IV antibiotics as appropriate for source of sepsis  While organ dysfunction may be noted elsewhere in this problem list or in the chart, degree of organ dysfunction does not meet CMS criteria for severe sepsis    S/P 30  ml/kg fluid resuscitation        Chronic thoracic aortic dissection (HCC)  Assessment & Plan  Noted on CT-CTA at the superior lateral aortic arch  -Cardiovascular surgery consulted; do not recommend any acute interventions  -Follow-up as an outpatient upon discharge with cardiovascular surgery  -Blood pressure control, goals less than 130/80    Acute respiratory failure (HCC)- (present on admission)  Assessment & Plan  Requiring 2L nc on admission  RT  duonebs  Wheezing on auscultation   BNP ordered to assess  CT-CTA without evidence of PE      Cellulitis- (present on admission)  Assessment & Plan  Cellulitis at LLE medial aspect to superior border of the malleolus  No obvious breakage of skin    -Unasyn discontinued 6/21  -Cont cefazolin 2 g every 8 hours for total of 5 days    Morbidly obese (HCC)- (present on admission)  Assessment & Plan  BMI 47  OT/PT        Acute kidney injury (HCC)- (present on admission)  Assessment & Plan  Possibly acute on chronic kidney disease with current GFR of 35, BUN/creatinine 28/1.85  IV fluids and   Continue to monitor    Fall- (present on admission)  Assessment & Plan  Probable falls mechanical without loss of consciousness or injuries  Fall precautions in place  OT/PT to evaluate and treat    Type 2 myocardial infarction (HCC)- (present on admission)  Assessment & Plan  Serial troponins -286-97, likely secondary to demand in the setting of A. fib with RVR  Repeated ECGs without signs of ischemia    Echocardiograms WNL  Stress test WNL      A-fib (HCC)- (present on admission)  Assessment & Plan  New onset A. fib with RVR; now Afib without RVR.  Admitted with telemetry  Start carvedilol 6.25 mg twice daily  As needed enalaprilat 1.25  Continuer enoxaparin 150  Consider transition to rivaroxaban 20 pending improvement of ANGIE function  Cardiology consulted, appreciate [facility]'s

## 2021-06-24 NOTE — DISCHARGE PLANNING
Received Choice form at 1610  Agency/Facility Name: [facility]  Referral sent per Choice form @ 1630    Received Choice form at 1610  Agency/Facility Name: [facility]   Referral sent per Choice form @ 1630

## 2021-06-24 NOTE — PROGRESS NOTES
Daily Progress Note:     Date of Service: 6/23/2021  Primary Team: [facility]   Attending: Aiden Weston D.O   Senior Resident: Dr. Zachery Adkins MD  Intern: Dr. ERIN Rapp MD  Contact:  [phone]    Chief Complaint:   Ground-level fall    Subjective:  Mr. Lewis is a 80-year-old man with past medical history remarkable for gout and hypertension who presented to the [facility] ED status post mechanical GLF, found to be septic secondary to LLE cellulitis, with A. fib with RVR, type II MI, ANGIE and acute hypoxic respiratory failure.  Patient reports that he has overextended his front wheel walker and fell onto his belly.  He did not hit his head, did not lose consciousness.      Consultants/Specialty:  Vascular surgery  Review of Systems:   Patient does not report any fevers or chills.  Denies chest pain, palpitation.  He does report shortness of breath, which is chronic.  No other complaints.  14 point review of system was conducted    Objective Data:   Physical Exam:   Vitals:   Temp:  [36.1 °C (97 °F)-36.6 °C (97.8 °F)] 36.6 °C (97.8 °F)  Pulse:  [71-95] 71  Resp:  [16-18] 18  BP: ()/(55-91) 94/55  SpO2:  [90 %-97 %] 92 %    General: Obese man laying in bed in no acute distress  HEENT: NC/AT.  PERRLA, EOMI.  External ear normal.  Nares patent, nonedematous nonerythematous.  Moist mucous membranes. Good dentition.  Trachea midline.   Neck: No JVP.  Carotid bruit could not be appreciated.  Nontender, nonnodular thyroid.  No anterior or posterior cervical, occipital, supraclavicular lymphadenopathy  Cardiovascular: PMI<2 cm at fifth costal space at midclavicular line.  No heaves appreciated.  No thrills.  Irregularly irregular tachycardia W/O M, R, G.  Pulmonary: Normal work of breathing.  Resonant to percussion.  CTAB, no wheezes, crackles, rhonchi heard in 10 lung fields  Abdomen: Obese, soft, nondistended.  Normoactive bowel sounds.  Nontender to percussion or palpation.  No hepatosplenomegaly  noted.  No fluid wave  Musculoskeletal: Normal tone and bulk.  Hemosiderin deposits noted bilaterally.  Edematous, erythematous left lower extremity, nonpurulent.  Skin: Warm and dry.  No rashes, bruises or lacerations noted  Neuro: Alert and oriented X4.  CN II-XII checked and intact.  5 out of 5 strength throughout.  DTR 2+ throughout.  FTN, HTS intact.  Sensation intact . negative Romberg.  Lymphatic: No edema or lymphadenopathy noted.  Psychiatric: Good mood congruent affect.  Thought form linear, content appropriate      Labs:   Recent Labs     06/20/21 2310 06/22/21  0202 06/23/21  0249   WBC 26.3* 19.3* 15.1*   RBC 4.83 4.78 4.25*   HEMOGLOBIN 14.0 13.5* 12.3*   HEMATOCRIT 44.4 44.1 39.3*   MCV 91.9 92.3 92.5   MCH 29.0 28.2 28.9   RDW 50.5* 50.8* 50.4*   PLATELETCT 148* 152* 151*   MPV 9.8 10.5 11.6   NEUTSPOLYS 86.70* 73.90* 75.60*   LYMPHOCYTES 6.30* 12.20* 11.30*   MONOCYTES 7.00 12.20 11.30   EOSINOPHILS 0.00 1.70 0.90   BASOPHILS 0.00 0.00 0.90   RBCMORPHOLO Present Normal Present     Recent Labs     06/20/21 2310 06/22/21 0202 06/23/21  0249   SODIUM 138 137 136   POTASSIUM 5.4 4.5 4.4   CHLORIDE 105 104 106   CO2 23 23 21   GLUCOSE 133* 95 91   BUN 28* 22 20     Lactic acid:  3.4 on admission -> 1.5  TSH/T4: 1.75/1.17  BNP: 253  Pro-Manpreet 0.15    Imaging:   ECG remarkable for sinus tachycardia with inferior infarct of indeterminate age  CT-CTA: Remarkable for curvilinear low-attenuation area along the superior lateral aortic arch.  (Suspicious for intramural hematoma and focal dissection of indeterminate age).  Low density suggests protracted chronicity    Mr. Lewis is a 80-year-old man with past medical history remarkable for gout and hypertension who presented to the [facility] ED status post mechanical GLF, found to be septic secondary to LLE cellulitis, with A. fib with RVR, type II MI, ANGIE and acute hypoxic respiratory failure, found to have chronic hematoma and focal dissection of the superior  lateral aortic arch.  Echocardiogram 6/22/2021:  EF 60-65%  Moderate pulmonary hypertension (RVSP 55 mmHg)  Otherwise normal  Lexiscan stress test 6/23/2021:  No evidence of ischemia or infarction    * Sepsis (HCC)- (present on admission)  Assessment & Plan  This is Sepsis Present on admission  SIRS criteria identified on my evaluation include: Fever, with temperature greater than 101 deg F  Source is likely left lower extremity cellulitis  Sepsis protocol initiated  Fluid resuscitation ordered per protocol  IV antibiotics as appropriate for source of sepsis  While organ dysfunction may be noted elsewhere in this problem list or in the chart, degree of organ dysfunction does not meet CMS criteria for severe sepsis    S/P 30 ml/kg fluid resuscitation        Cellulitis- (present on admission)  Assessment & Plan  Cellulitis at LLE medial aspect to superior border of the malleolus  No obvious breakage of skin    -Unasyn discontinued 6/21  -Cont cefazolin 2 g every 8 hours for total of 5 days    A-fib (HCC)- (present on admission)  Assessment & Plan  New onset A. fib with RVR; now Afib without RVR.  Admitted with telemetry  Start carvedilol 6.25 mg twice daily  As needed enalaprilat 1.25  Continuer enoxaparin 150  Consider transition to rivaroxaban 20 pending improvement of ANGIE function  Cardiology consulted, appreciate [facility]'s      Chronic thoracic aortic dissection (HCC)  Assessment & Plan  Noted on CT-CTA at the superior lateral aortic arch  -Cardiovascular surgery consulted; do not recommend any acute interventions  -Follow-up as an outpatient upon discharge with cardiovascular surgery  -Blood pressure control, goals less than 130/80    Acute respiratory failure (HCC)- (present on admission)  Assessment & Plan  Requiring 2L nc on admission  RT  duonebs  Wheezing on auscultation   BNP ordered to assess  CT-CTA without evidence of PE      Morbidly obese (HCC)- (present on admission)  Assessment & Plan  BMI  47  OT/PT        Acute kidney injury (HCC)- (present on admission)  Assessment & Plan  Possibly acute on chronic kidney disease with current GFR of 35, BUN/creatinine 28/1.85  IV fluids and   Continue to monitor    Fall- (present on admission)  Assessment & Plan  Probable falls mechanical without loss of consciousness or injuries  Fall precautions in place  OT/PT to evaluate and treat    Type 2 myocardial infarction (HCC)- (present on admission)  Assessment & Plan  Serial troponins -067-97, likely secondary to demand in the setting of A. fib with RVR  Repeated ECGs without signs of ischemia    Echocardiograms WNL  Stress test WNL

## 2021-06-24 NOTE — DISCHARGE PLANNING
Anticipated Discharge Disposition: home with home health and abx infusions     Action: met with pt and pt's wife at bedside for discharge planning. Pt requires home health and long term iv abx.     Received iv abx order from ID.     Pt chose [facility] for IV abx.     Pt chose:     1. [facility]   2. [facility]   3. [facility]       Pt states that he has not seen his PCP Denver Miller in over a year. Pt understands that he will require a PCP follow up for coordination of care for home health and [facility].     Barriers to Discharge: medical clearance, [facility] acceptance, home health acceptance, pcp follow up     Plan: LSW to assist as needed and monitor for barriers to discharge.

## 2021-06-25 ENCOUNTER — APPOINTMENT (OUTPATIENT)
Dept: RADIOLOGY | Facility: MEDICAL CENTER | Age: 81
DRG: 871 | End: 2021-06-25
Attending: STUDENT IN AN ORGANIZED HEALTH CARE EDUCATION/TRAINING PROGRAM
Payer: MEDICARE

## 2021-06-25 ENCOUNTER — TELEPHONE (OUTPATIENT)
Dept: OTHER | Facility: MEDICAL CENTER | Age: 81
End: 2021-06-25

## 2021-06-25 ENCOUNTER — PATIENT OUTREACH (OUTPATIENT)
Dept: HEALTH INFORMATION MANAGEMENT | Facility: OTHER | Age: 81
End: 2021-06-25

## 2021-06-25 VITALS
TEMPERATURE: 98.1 F | HEART RATE: 95 BPM | BODY MASS INDEX: 44.1 KG/M2 | OXYGEN SATURATION: 93 % | DIASTOLIC BLOOD PRESSURE: 91 MMHG | HEIGHT: 71 IN | RESPIRATION RATE: 20 BRPM | WEIGHT: 315 LBS | SYSTOLIC BLOOD PRESSURE: 147 MMHG

## 2021-06-25 LAB
ANION GAP SERPL CALC-SCNC: 10 MMOL/L (ref 7–16)
BUN SERPL-MCNC: 21 MG/DL (ref 8–22)
C DIFF DNA SPEC QL NAA+PROBE: NEGATIVE
C DIFF TOX GENS STL QL NAA+PROBE: NEGATIVE
CALCIUM SERPL-MCNC: 9.6 MG/DL (ref 8.5–10.5)
CHLORIDE SERPL-SCNC: 104 MMOL/L (ref 96–112)
CO2 SERPL-SCNC: 22 MMOL/L (ref 20–33)
CREAT SERPL-MCNC: 1.62 MG/DL (ref 0.5–1.4)
ERYTHROCYTE [DISTWIDTH] IN BLOOD BY AUTOMATED COUNT: 48.5 FL (ref 35.9–50)
GLUCOSE SERPL-MCNC: 116 MG/DL (ref 65–99)
HCT VFR BLD AUTO: 40 % (ref 42–52)
HGB BLD-MCNC: 12.4 G/DL (ref 14–18)
MCH RBC QN AUTO: 28.3 PG (ref 27–33)
MCHC RBC AUTO-ENTMCNC: 31 G/DL (ref 33.7–35.3)
MCV RBC AUTO: 91.3 FL (ref 81.4–97.8)
PLATELET # BLD AUTO: 173 K/UL (ref 164–446)
PMV BLD AUTO: 12.6 FL (ref 9–12.9)
POTASSIUM SERPL-SCNC: 4.5 MMOL/L (ref 3.6–5.5)
RBC # BLD AUTO: 4.38 M/UL (ref 4.7–6.1)
SODIUM SERPL-SCNC: 136 MMOL/L (ref 135–145)
WBC # BLD AUTO: 14.3 K/UL (ref 4.8–10.8)

## 2021-06-25 PROCEDURE — 700102 HCHG RX REV CODE 250 W/ 637 OVERRIDE(OP): Performed by: STUDENT IN AN ORGANIZED HEALTH CARE EDUCATION/TRAINING PROGRAM

## 2021-06-25 PROCEDURE — 80048 BASIC METABOLIC PNL TOTAL CA: CPT

## 2021-06-25 PROCEDURE — 99233 SBSQ HOSP IP/OBS HIGH 50: CPT | Performed by: INTERNAL MEDICINE

## 2021-06-25 PROCEDURE — 700111 HCHG RX REV CODE 636 W/ 250 OVERRIDE (IP): Performed by: STUDENT IN AN ORGANIZED HEALTH CARE EDUCATION/TRAINING PROGRAM

## 2021-06-25 PROCEDURE — A9270 NON-COVERED ITEM OR SERVICE: HCPCS | Performed by: PHYSICIAN ASSISTANT

## 2021-06-25 PROCEDURE — 87493 C DIFF AMPLIFIED PROBE: CPT

## 2021-06-25 PROCEDURE — 05HC33Z INSERTION OF INFUSION DEVICE INTO LEFT BASILIC VEIN, PERCUTANEOUS APPROACH: ICD-10-PCS | Performed by: STUDENT IN AN ORGANIZED HEALTH CARE EDUCATION/TRAINING PROGRAM

## 2021-06-25 PROCEDURE — 99239 HOSP IP/OBS DSCHRG MGMT >30: CPT | Mod: GC | Performed by: STUDENT IN AN ORGANIZED HEALTH CARE EDUCATION/TRAINING PROGRAM

## 2021-06-25 PROCEDURE — B54NZZA ULTRASONOGRAPHY OF LEFT UPPER EXTREMITY VEINS, GUIDANCE: ICD-10-PCS | Performed by: STUDENT IN AN ORGANIZED HEALTH CARE EDUCATION/TRAINING PROGRAM

## 2021-06-25 PROCEDURE — 76937 US GUIDE VASCULAR ACCESS: CPT

## 2021-06-25 PROCEDURE — 36415 COLL VENOUS BLD VENIPUNCTURE: CPT

## 2021-06-25 PROCEDURE — 700102 HCHG RX REV CODE 250 W/ 637 OVERRIDE(OP): Performed by: PHYSICIAN ASSISTANT

## 2021-06-25 PROCEDURE — A9270 NON-COVERED ITEM OR SERVICE: HCPCS | Performed by: STUDENT IN AN ORGANIZED HEALTH CARE EDUCATION/TRAINING PROGRAM

## 2021-06-25 PROCEDURE — 85027 COMPLETE CBC AUTOMATED: CPT

## 2021-06-25 RX ORDER — CARVEDILOL 6.25 MG/1
6.25 TABLET ORAL 2 TIMES DAILY WITH MEALS
Qty: 180 TABLET | Refills: 1 | Status: SHIPPED | OUTPATIENT
Start: 2021-06-25

## 2021-06-25 RX ORDER — 0.9 % SODIUM CHLORIDE 0.9 %
5 VIAL (ML) INJECTION PRN
Status: CANCELLED | OUTPATIENT
Start: 2021-06-25

## 2021-06-25 RX ORDER — 0.9 % SODIUM CHLORIDE 0.9 %
10-20 VIAL (ML) INJECTION PRN
Status: CANCELLED | OUTPATIENT
Start: 2021-06-25

## 2021-06-25 RX ORDER — HEPARIN SODIUM (PORCINE) LOCK FLUSH IV SOLN 100 UNIT/ML 100 UNIT/ML
500 SOLUTION INTRAVENOUS PRN
Status: CANCELLED | OUTPATIENT
Start: 2021-06-25

## 2021-06-25 RX ADMIN — CEFAZOLIN SODIUM 2 G: 2 INJECTION, SOLUTION INTRAVENOUS at 13:51

## 2021-06-25 RX ADMIN — CEFAZOLIN SODIUM 2 G: 2 INJECTION, SOLUTION INTRAVENOUS at 05:13

## 2021-06-25 RX ADMIN — CARVEDILOL 6.25 MG: 6.25 TABLET, FILM COATED ORAL at 07:36

## 2021-06-25 RX ADMIN — APIXABAN 5 MG: 5 TABLET, FILM COATED ORAL at 05:13

## 2021-06-25 ASSESSMENT — ENCOUNTER SYMPTOMS: FEVER: 0

## 2021-06-25 ASSESSMENT — PAIN DESCRIPTION - PAIN TYPE: TYPE: ACUTE PAIN

## 2021-06-25 ASSESSMENT — FIBROSIS 4 INDEX: FIB4 SCORE: 5.73

## 2021-06-25 NOTE — CARE PLAN
"The patient is Stable - Low risk of patient condition declining or worsening    Shift Goals  Clinical Goals: \"Remain comfortable through the night and sleep.\"  Patient Goals: \"Patient will verbalize plan of care r/t iv antibiotics.\"  Family Goals: N/A    Progress made toward(s) clinical / shift goals:  Patient educated on POC to include a discussion r/t need for long term IV antibiotics. Patient verbalized understanding of POC and had no questions or concerns.     Patient is not progressing towards the following goals: N/A      "

## 2021-06-25 NOTE — TELEPHONE ENCOUNTER
Priscila Magallanes M.D.  You 2 minutes ago (11:57 AM)     NO      No other abx options per .  Hospitalist has been paged in regards to patients difficulty. Patient recommended to contact  for any other abx IV infusion management concerns.

## 2021-06-25 NOTE — PROGRESS NOTES
Assumed care of pt at 1100. Pt is ano4x, VSS, no complaints at this time. Pt placed in bed, bed is locked and in lowest position with call light in reach. All belongings, chart and medications transferred from 711-1 to 720.

## 2021-06-25 NOTE — PROGRESS NOTES
Received bedside report from PEG Palafox, pt care assumed, VS WDL, pt assessment complete. Pt AAOx4, no c/o pain at this time. No signs of acute distress noted at this time. POC discussed with pt and verbalizes no questions. Pt denies any additional needs at this time. Bed in lowest position, bed alarm off as client is low fall risk and up to self. Pt educated on fall risk and verbalized understanding, call light within reach, hourly rounding initiated.

## 2021-06-25 NOTE — PROCEDURES
Vascular Access Team    Date of Insertion: 6/25/21  Arm Circumference: 44  Internal length: 14  External Length: 0  Vein Occupancy %: 24  Reason for Midline: antibiotic therapy   Labs: WBC 14.3, , BUN 21, Cr 1.62, GFR 41, INR 1.22 on 6/20/21    Orders confirmed, vessel patency confirmed with ultrasound. Risks and benefits of procedure explained to patient and education regarding line associated bloodstream infections provided. Questions answered.     Power Midline placed in LUE per licensed provider order with ultrasound guidance. 4 Fr, single lumen Power Midline placed in basilic vein after 1 attempt(s). 2 mL of 1% lidocaine injected intradermally, 21 gauge microintroducer needle and modified Seldinger technique used. 14 cm catheter inserted with good blood return. Secured at 0 cm marker. Each lumen flushed without resistance with 10 mL 0.9% normal saline. Midline secured with Biopatch and Tegaderm.     Midline placement is confirmed by nurse using ultrasound and ability to flush and draw blood. Midline is appropriate for use at this time.  Patient tolerated procedure well, without complications.  No X-ray is needed for placement confirmation.  Patient condition relayed to unit RN or ordering physician via this post procedure note in the EMR.     Ultrasound images uploaded to PACS and viewable in the EMR - yes  Ultrasound imaged printed and placed in paper chart - no     BARD Power Midline ref # [id number], Lot # [id number], Expiration Date 02/28/2022

## 2021-06-25 NOTE — CARE PLAN
"The patient is Stable - Low risk of patient condition declining or worsening    Shift Goals  Clinical Goals: C diff rule out today  Patient Goals: \"Patient will verbalize plan of care r/t iv antibiotics.\"  Family Goals: N/A    Progress made toward(s) clinical / shift goals:  N/A    Patient is not progressing towards the following goals: N/A      Problem: Skin Integrity  Goal: Skin integrity is maintained or improved  Outcome: Progressing     Problem: Knowledge Deficit - Standard  Goal: Patient and family/care givers will demonstrate understanding of plan of care, disease process/condition, diagnostic tests and medications  Outcome: Progressing         "

## 2021-06-25 NOTE — DISCHARGE PLANNING
@9888  Sent HH referral to 2)[facility] per LSW Caridad Aguilar    Agency/Facility Name: [facility]  Spoke To: Valarie  Outcome: This DPA informed Valarie that pt has chosen another HH agency and will be canceling services with [facility]

## 2021-06-25 NOTE — DISCHARGE INSTRUCTIONS
Discharge Instructions    Discharged to home by car with relative. Discharged via wheelchair, hospital escort: Yes.  Special equipment needed: Not Applicable    Be sure to schedule a follow-up appointment with your primary care doctor or any specialists as instructed.     Discharge Plan:   Diet Plan: Discussed  Activity Level: Discussed  Confirmed Follow up Appointment: Appointment Scheduled  Confirmed Symptoms Management: Discussed  Medication Reconciliation Updated: Yes    I understand that a diet low in cholesterol, fat, and sodium is recommended for good health. Unless I have been given specific instructions below for another diet, I accept this instruction as my diet prescription.   Other diet: Heart healthy diet    Special Instructions:   Sepsis, Diagnosis, Adult  Sepsis is a serious bodily reaction to an infection. The infection that triggers sepsis may be from a bacteria, virus, or fungus. Sepsis can result from an infection in any part of your body. Infections that commonly lead to sepsis include skin, lung, and urinary tract infections.  Sepsis is a medical emergency that must be treated right away in a hospital. In severe cases, it can lead to septic shock. Septic shock can weaken your heart and cause your blood pressure to drop. This can cause your central nervous system and your body's organs to stop working.  What are the causes?  This condition is caused by a severe reaction to infections from bacteria, viruses, or fungus. The germs that most often lead to sepsis include:  · Escherichia coli (E. coli) bacteria.  · Staphylococcus aureus (staph) bacteria.  · Some types of Streptococcus bacteria.  The most common infections affect these organs:  · The lung (pneumonia).  · The kidneys or bladder (urinary tract infection).  · The skin (cellulitis).  · The bowel, gallbladder, or pancreas.  What increases the risk?  You are more likely to develop this condition if:  · Your body's disease-fighting system  (immune system) is weakened.  · You are age 65 or older.  · You are male.  · You had surgery or you have been hospitalized.  · You have these devices inserted into your body:  ? A small, thin tube (catheter).  ? IV line.  ? Breathing tube.  ? Drainage tube.  · You are not getting enough nutrients from food (malnourished).  · You have a long-term (chronic) disease, such as cancer, lung disease, kidney disease, or diabetes.  · You are .  What are the signs or symptoms?  Symptoms of this condition may include:  · Fever.  · Chills or feeling very cold.  · Confusion or anxiety.  · Fatigue.  · Muscle aches.  · Shortness of breath.  · Nausea and vomiting.  · Urinating much less than usual.  · Fast heart rate (tachycardia).  · Rapid breathing (hyperventilation).  · Changes in skin color. Your skin may look blotchy, pale, or blue.  · Cool, clammy, or sweaty skin.  · Skin rash.  Other symptoms depend on the source of your infection.  How is this diagnosed?  This condition is diagnosed based on:  · Your symptoms.  · Your medical history.  · A physical exam.  Other tests may also be done to find out the cause of the infection and how severe the sepsis is. These tests may include:  · Blood tests.  · Urine tests.  · Swabs from other areas of your body that may have an infection. These samples may be tested (cultured) to find out what type of bacteria is causing the infection.  · Chest X-ray to check for pneumonia. Other imaging tests, such as a CT scan, may also be done.  · Lumbar puncture. This removes a small amount of the fluid that surrounds your brain and spinal cord. The fluid is then examined for infection.  How is this treated?  This condition must be treated in a hospital. Based on the cause of your infection, you may be given an antibiotic, antiviral, or antifungal medicine.  You may also receive:  · Fluids through an IV.  · Oxygen and breathing assistance.  · Medicines to increase your blood  pressure.  · Kidney dialysis. This process cleans your blood if your kidneys have failed.  · Surgery to remove infected tissue.  · Blood transfusion if needed.  · Medicine to prevent blood clots.  · Nutrients to correct imbalances in basic body function (metabolism). You may:  ? Receive important salts and minerals (electrolytes) through an IV.  ? Have your blood sugar level adjusted.  Follow these instructions at home:  Medicines    · Take over-the-counter and prescription medicines only as told by your health care provider.  · If you were prescribed an antibiotic, antiviral, or antifungal medicine, take it as told by your health care provider. Do not stop taking the medicine even if you start to feel better.  General instructions  · If you have a catheter or other indwelling device, ask to have it removed as soon as possible.  · Keep all follow-up visits as told by your health care provider. This is important.  Contact a health care provider if:  · You do not feel like you are getting better or regaining strength.  · You are having trouble coping with your recovery.  · You frequently feel tired.  · You feel worse or do not seem to get better after surgery.  · You think you may have an infection after surgery.  Get help right away if:  · You have any symptoms of sepsis.  · You have difficulty breathing.  · You have a rapid or skipping heartbeat.  · You become confused or disoriented.  · You have a high fever.  · Your skin becomes blotchy, pale, or blue.  · You have an infection that is getting worse or not getting better.  These symptoms may represent a serious problem that is an emergency. Do not wait to see if the symptoms will go away. Get medical help right away. Call your local emergency services (911 in the [place] Do not drive yourself to the hospital.  Summary  · Sepsis is a medical emergency that requires immediate treatment in a hospital.  · This condition is caused by a severe reaction to infections from  bacteria, viruses, or fungus.  · Based on the cause of your infection, you may be given an antibiotic, antiviral, or antifungal medicine.  · Treatment may also include IV fluids, breathing assistance, and kidney dialysis.  This information is not intended to replace advice given to you by your health care provider. Make sure you discuss any questions you have with your health care provider.  Document Released: 09/15/2004 Document Revised: 07/26/2019 Document Reviewed: 07/26/2019  [employer] Patient Education © 2020 [employer]      · Is patient discharged on Warfarin / Coumadin?   No     Depression / Suicide Risk    As you are discharged from this [facility] facility, it is important to learn how to keep safe from harming yourself.    Recognize the warning signs:  · Abrupt changes in personality, positive or negative- including increase in energy   · Giving away possessions  · Change in eating patterns- significant weight changes-  positive or negative  · Change in sleeping patterns- unable to sleep or sleeping all the time   · Unwillingness or inability to communicate  · Depression  · Unusual sadness, discouragement and loneliness  · Talk of wanting to die  · Neglect of personal appearance   · Rebelliousness- reckless behavior  · Withdrawal from people/activities they love  · Confusion- inability to concentrate     If you or a loved one observes any of these behaviors or has concerns about self-harm, here's what you can do:  · Talk about it- your feelings and reasons for harming yourself  · Remove any means that you might use to hurt yourself (examples: pills, rope, extension cords, firearm)  · Get professional help from the community (Mental Health, Substance Abuse, psychological counseling)  · Do not be alone:Call your Safe Contact- someone whom you trust who will be there for you.  · Call your local CRISIS HOTLINE 914-3134 or 463-982-0844  · Call your local Children's Mobile Crisis Response Team [place] (550) 980-8544 or www.[employer].velingo  · Call the toll free National Suicide Prevention Hotlines   · National Suicide Prevention Lifeline 303-390-UMRL (4829)  · National [facility] Line Network 800-SUICIDE (615-4224)

## 2021-06-25 NOTE — PROGRESS NOTES
Handoff report received from night shift nurse. Pt care assumed. Pt is currently resting in bed. POC discussed with Pt and Pt verbalizes no questions at this time. Pt is AAOx4, on Ra, patient is medical, and VSS. Call light and belongings within reach, bed in lowest and locked position, and Pt educated on use of call light. Will continue to monitor.

## 2021-06-25 NOTE — CARE PLAN
Problem: Knowledge Deficit - Standard  Goal: Patient and family/care givers will demonstrate understanding of plan of care, disease process/condition, diagnostic tests and medications  Outcome: Progressing  Note: Pt and wife actively participates in POC. Pt and board updated, all questions and concerns answered.  Addressing home needs to prepare for discharge as well.     Problem: Hemodynamics  Goal: Patient's hemodynamics, fluid balance and neurologic status will be stable or improve  Outcome: Progressing   The patient is Stable - Low risk of patient condition declining or worsening    Shift Goals  Clinical Goals: Pt will be weaned off o2  Patient Goals: go home  Family Goals: N/A    Progress made toward(s) clinical / shift goals:  Pt has been saturating on RA >90% during shift. No oxygen requirements at this time.

## 2021-06-25 NOTE — DISCHARGE PLANNING
Made PCP appt for pt.     Denver Miller     7/1/21 @ 3p     [address] [place] [id number].     [facility] advised that they cannot see pt until after PCP appt.     Sent referral to [facility].    Notified Pt and pt's wife about update with [facility].

## 2021-06-25 NOTE — PROGRESS NOTES
Infectious Disease Progress Note    Author: Priscila Magallanes M.D. Date & Time of service: 2021  12:21 PM    Chief Complaint:  Staph lugdunensis sepsis       Interval History:  80-year-old male admitted to the hospital on 2021 after a ground level fall-found to have above   AF WBC 14.3 Wife called our office stating she cannot do home infusion-advised no PO options for treatment. Out of room at time of rounds    Labs Reviewed and Medications Reviewed.    Review of Systems:  Review of Systems   Unable to perform ROS: Other   Constitutional: Negative for fever.       Hemodynamics:  Temp (24hrs), Av.6 °C (97.8 °F), Min:36.3 °C (97.3 °F), Max:36.9 °C (98.4 °F)  Temperature: 36.7 °C (98.1 °F)  Pulse  Av.9  Min: 64  Max: 140   Blood Pressure : 147/91       Physical Exam:  Physical Exam  Vitals and nursing note reviewed.   Cardiovascular:      Rate and Rhythm: Tachycardia present.   Pulmonary:      Effort: No respiratory distress.         Meds:    Current Facility-Administered Medications:   •  Special Contact Isolation **AND** Pharmacy  •  apixaban  •  ipratropium-albuterol  •  senna-docusate **AND** polyethylene glycol/lytes **AND** magnesium hydroxide **AND** bisacodyl  •  acetaminophen  •  enalaprilat  •  labetalol  •  ondansetron  •  ondansetron  •  Metoprolol Tartrate  •  ceFAZolin  •  carvedilol    Labs:  Recent Labs     21  0249 21  0146 21  0233   WBC 15.1* 14.3* 14.3*   RBC 4.25* 4.63* 4.38*   HEMOGLOBIN 12.3* 13.3* 12.4*   HEMATOCRIT 39.3* 41.4* 40.0*   MCV 92.5 89.4 91.3   MCH 28.9 28.7 28.3   RDW 50.4* 47.5 48.5   PLATELETCT 151* 179 173   MPV 11.6 12.1 12.6   NEUTSPOLYS 75.60* 72.20*  --    LYMPHOCYTES 11.30* 12.50*  --    MONOCYTES 11.30 13.00  --    EOSINOPHILS 0.90 1.20  --    BASOPHILS 0.90 0.50  --    RBCMORPHOLO Present  --   --      Recent Labs     21  0249 21  0233   SODIUM 136 136   POTASSIUM 4.4 4.5   CHLORIDE 106 104   CO2 21 22   GLUCOSE 91  116*   BUN 20 21     Recent Labs     21  0249 21  0233   ALBUMIN 3.2  --    TBILIRUBIN 0.4  --    ALKPHOSPHAT 61  --    TOTPROTEIN 6.5  --    ALTSGPT 12  --    ASTSGOT 44  --    CREATININE 1.54* 1.62*       Imaging:  DX-CHEST-PORTABLE (1 VIEW)    Result Date: 2021 11:12 PM HISTORY/REASON FOR EXAM:  Shortness of Breath. TECHNIQUE/EXAM DESCRIPTION AND NUMBER OF VIEWS: Single portable view of the chest. COMPARISON: None FINDINGS: LUNGS: The lungs are clear. HEART and MEDIASTINUM: enlarged. Pleura: There are no pleural effusion or pneumothoraces. Osseous structures: No significant bony abnormality.     No acute cardiopulmonary abnormality identified.    NM-CARDIAC STRESS TEST    Result Date: 2021   Myocardial Perfusion  Report  NUCLEAR IMAGING INTERPRETATION  No evidence of significant jeopardized viable myocardium or prior myocardial  infarction.  Normal left ventricular size, ejection fraction, and wall motion.  ECG INTERPRETATION  Negative stress ECG for ischemia.  SINDHU CONNER  MRN:    [id number]         Gender:    M  Exam Date: 2021 06:40  Exam Location:      Inpatient  Ordering Phys:     BEBE SUMMERS  NucMed Tech:       Aiden Talbert RT                     (R,N)  Age:    80    :    1940        Ht (in):     71  Wt (lb):     328    BMI:    45.86       Radiologist  Risk Factors:             Family history of coronary disease, Hypertension,                            Obesity  Indications:              CAD  ICD Codes:                414.9  Cardiac History:          Positive risk factors, Dyspnea on exertion, Palpitations  Cardiac Meds:  Meds Past 24 hrs:  Pretest Chest Pain:       No symptoms  STRESS TEST      Pharmacologi                   c  Kaelyn   Lexiscan       Dose: 0.4 mg  ol:  Post-Injection Exercise:        No exercise followed the intravenous injection  Resting HR (bpm):      94  Peak HR (bpm):         112  Resting BP (mmHg):       126    /   83  Peak BP  (mmHg):       126   /   83  MaxPHR:     142     Target HR (bpm):       121  % MaxPHR:     79  Double Product:       [id number]  BP Response:  Stress Termination:       Protocol completed  Stress Symptoms:  Arrhythmia: PVC , PAC  ECG  Resting ECG:     Sinus rhythm.  Stress ECG:      No ischemic changes with Regadenoson.  IMAGE PROTOCOL      Rest/Stress 1                      Day          RadiopharmaceuticalDose (mCi)   Imaging  Date      Imaging  Time         Inj to Img Time (min)  Rest:   Tc-99m             8.1          23-Jun-2021        08:28          Tetrofosmin  Stress: Tc-99m             29.6         23-Jun-2021        09:25          Tetrofosmin  Rest:  Administration Site:       Left hand  Administered by:      Aiden Talbert RT (R,N)  Stress:  Administration Site:       Left hand  Administered by:      Aiden Talbert RT (R,N)  % Percent HR Achieved:  SPECT RESULTS  Technical Quality:       Good  Raw Data Analysis:  Summed Stress Score:    0  Summed Rest Score:    5  Summed Difference Score:        0  PERFUSION:  Normal myocardial perfusion with no ischemia.  FUNCTIONAL RESULTS (calculated via Gated SPECT)  Stress Image LV EF:        60     %  Upper Normal Limit  Stress EDV:      92     ml   EDVI:    35      ml/m2  Stress ESV:      37     ml   ESVI:    14      ml/m2  TID:    0.98   TID - 1.19      TID (ed) - 1.23  LV Function:  Normal left ventricular wall motion.  LV ejection fraction = 60%.  Marysol Clayton  Edited by: Hernandez Dotson MD  (Electronically Signed)  Final Date:      23 June 2021                   10:02  Amended:         23 June 2021 13:07    CT-CTA CHEST PULMONARY ARTERY W/ RECONS    Result Date: 6/21/2021 6/21/2021 11:26 AM HISTORY/REASON FOR EXAM:  PE suspected, high prob Wheezing. Elevated d-dimer. TECHNIQUE/EXAM DESCRIPTION: CT angiogram scan for pulmonary embolism with contrast, with reconstructions. 1.25 mm helical sections were obtained from the lung apices through the lung bases  following the rapid bolus administration of 100 mL of Omnipaque 350 nonionic contrast. Thin-section overlapping reconstruction interval was utilized. Coronal reconstructions were generated from the axial data. MIP post processing was performed and utilized for the interpretation. Low dose optimization technique was utilized for this CT exam including automated exposure control and adjustment of the mA and/or kV according to patient size. COMPARISON: None FINDINGS: Pulmonary Embolism: No. Additional Comments: There is a curvilinear hypodense area along the superior-lateral aspect of the aortic arch beginning just distal to the subclavian artery origin which causes some flattening of the aortic lumen. There is only very minimal calcific atherosclerosis of the thoracic aorta. The density measurement is 20 with suggests fluid or old blood rather than acute hemorrhage. Heart is normal in size without pericardial effusion. Lungs: Lungs demonstrate a few patchy groundglass opacities likely due to atelectasis. There is no pneumonia or pneumothorax. Pleura: No pleural effusion. Nodes: No enlarged lymph nodes. There are calcified mediastinal and hilar nodes consistent with prior inflammation. Additional findings: Limited imaging of the upper abdomen demonstrates punctate calcifications in the spleen compatible with prior granulomatous inflammatory process. There is no adenopathy or acute abnormality. There is no acute bony process.     1.  There is no CT evidence of acute pulmonary embolism. 2.  Curvilinear low-attenuation area along the superior lateral aortic arch suspicious for intramural hematoma and focal dissection, age indeterminate. The low density suggests this may be old blood. 3.  There is evidence of previous granulomatous inflammatory process. 4.  Findings were discussed with Dr. Enrico montana for [facility] on 6/21/2021 at 12:26 PM.     EC-ECHOCARDIOGRAM COMPLETE W/ CONT    Result Date:  2021  Transthoracic Echo Report Echocardiography Laboratory CONCLUSIONS Normal left ventricular systolic function. Left ventricular ejection fraction is visually estimated to be 60-65%. Normal right ventricular size and systolic function. No significant valve abnormalities. Right heart pressures are consistent with moderate pulmonary hypertension. No prior study is available for comparison. SINDHU CONNER Exam Date:         2021                    15:10 Exam Location:     Inpatient Priority:          Routine Ordering Physician:        [facility] Referring Physician: Sonographer:               Pat Whaley RDCS Age:    80     Gender:    M MRN:    [id number] :    1940 BSA:    2.64   Ht (in):    71     Wt (lb):    340 Exam Type:     Complete, Contrast Indications:     Atrial Fibrillation ICD Codes:       427.31 CPT Codes:       01482 BP:   121    /   58     HR:   105 Technical Quality:       Fair MEASUREMENTS  (Male / Female) Normal Values 2D ECHO LV Diastolic Diameter PLAX        4.9 cm                4.2 - 5.9 / 3.9 - 5.3 cm LV Systolic Diameter PLAX         3 cm                  2.1 - 4.0 cm IVS Diastolic Thickness           0.92 cm               LVPW Diastolic Thickness          1 cm                  Estimated LV Ejection Fraction    60 %                  LV Ejection Fraction MOD BP       60.5 %                >= 55  % LV Ejection Fraction MOD 4C       66.7 %                LV Ejection Fraction MOD 2C       51.7 %                DOPPLER AV Peak Velocity                  1.2 m/s               AV Peak Gradient                  6.2 mmHg              AV Mean Gradient                  3.5 mmHg              LVOT Peak Velocity                0.9 m/s               Mitral E Point Velocity           1.1 m/s               Mitral E to A Ratio               1557                  MV Pressure Half Time             43.2 ms               MV Area PHT                       5.1 cm2               MV Deceleration  Time              149 ms                TR Peak Velocity                  336 cm/s              * Indicates values subject to auto-interpretation LV EF:  60    % FINDINGS Left Ventricle Contrast was used to enhance visualization of the endocardial border. 3 ML of contrast was administered. Existing IV was used. Normal left ventricular chamber size. Normal left ventricular wall thickness. Normal left ventricular systolic function. Left ventricular ejection fraction is visually estimated to be 60-65%. Normal regional wall motion. Right Ventricle Normal right ventricular size and systolic function. Right Atrium Normal right atrial size. Vena cava is not well visualized. Left Atrium Normal left atrial size. Left atrial volume index is 19 mL/sq m. Mitral Valve Structurally normal mitral valve. No stenosis or regurgitation seen. Aortic Valve Structurally normal aortic valve. No stenosis or regurgitation seen. Tricuspid Valve Structurally normal tricuspid valve. No tricuspid stenosis. Mild tricuspid regurgitation. Estimated right ventricular systolic pressure  is 55 mmHg. Right heart pressures are consistent with moderate pulmonary hypertension. Pulmonic Valve The pulmonic valve is not well visualized. Pericardium Normal pericardium without effusion. Aorta Ascending aorta not well visualized. Brenton Alexandra M.D. (Electronically Signed) Final Date:     22 June 2021                 20:19    IR-MIDLINE CATHETER INSERTION WO GUIDANCE > AGE 3    Result Date: 6/25/2021  HISTORY/REASON FOR EXAM:  Midline Placement   TECHNIQUE/EXAM DESCRIPTION AND NUMBER OF VIEWS: Midline insertion with ultrasound guidance.  FINDINGS: Midline insertion with Ultrasound Guidance was performed by qualified nursing staff without the assistance of a Radiologist. Midline positioning as measured by RN or as appropriate length of catheter selected.              Ultrasound-guided midline placement performed by qualified nursing staff as above.  "      Micro:  Results     Procedure Component Value Units Date/Time    C Diff by PCR rflx Toxin [[id number]] Collected: 06/25/21 1030    Order Status: Completed Specimen: Stool Updated: 06/25/21 1059    Narrative:      Special Contact [id number] CLAUDIO GRIFFIN  Does this patient have risk factors for C-diff?->Yes    BLOOD CULTURE x2 [[id number]]  (Abnormal) Collected: 06/20/21 2350    Order Status: Completed Specimen: Blood from Peripheral Updated: 06/24/21 1222     Significant Indicator POS     Source BLD     Site PERIPHERAL     Culture Result Growth detected by Bactec instrument. 06/22/2021  04:40      Staphylococcus epidermidis  See previous culture for sensitivity report.        Coagulase-negative Staphylococcus species  Possible Contaminant  Isolated from one set only, please correlate with clinical  condition. Contact the Microbiology department within 48 hr  if identification and susceptibility are needed.      Narrative:      CALL  Martinez  171 tel. [phone],  CALLED  171 tel. [phone] 06/22/2021, 04:44, RB PERF. RESULTS CALLED TO: RN  [phone]  Per Hospital Policy: Only change Specimen Src: to \"Line\" if  specified by physician order.  No site indicated    BLOOD CULTURE x2 [[id number]]  (Abnormal)  (Susceptibility) Collected: 06/20/21 2345    Order Status: Completed Specimen: Blood from Peripheral Updated: 06/24/21 1218     Significant Indicator POS     Source BLD     Site PERIPHERAL     Culture Result Growth detected by Bactec instrument. 6/21/2021  21:15      Staphylococcus epidermidis      Staphylococcus lugdunensis      Coagulase-negative Staphylococcus species  Possible Contaminant  Isolated from one set only, please correlate with clinical  condition. Contact the Microbiology department within 48 hr  if identification and susceptibility are needed.      Narrative:      CALL  Martinez  171 tel. [phone],  CALLED  171 tel. [phone] 06/21/2021, 21:18, RB PERF. RESULTS CALLED TO:RN  [phone]  Per Hospital Policy: " "Only change Specimen Src: to \"Line\" if  specified by physician order.  No site indicated    Susceptibility     Staphylococcus epidermidis (1)     Antibiotic Interpretation Microscan Method Status    Azithromycin Resistant >4 mcg/mL PATRICK Final    Clindamycin Sensitive <=0.25 mcg/mL PATRICK Final    Cefazolin Sensitive <=8 mcg/mL PATRICK Final    Cefepime Sensitive <=4 mcg/mL PATRICK Final    Daptomycin Sensitive <=0.5 mcg/mL PATRICK Final    Erythromycin Resistant >4 mcg/mL PATRICK Final    Ampicillin/sulbactam Sensitive <=8/4 mcg/mL PATRICK Final    Oxacillin Sensitive <=0.25 mcg/mL PATRICK Final    Pip/Tazobactam Sensitive <=8 mcg/mL PATRICK Final    Trimeth/Sulfa Sensitive <=0.5/9.5 mcg/mL PATRICK Final    Tetracycline Sensitive <=4 mcg/mL PATRICK Final    Vancomycin Sensitive 1 mcg/mL PATRICK Final          Staphylococcus lugdunensis (2)     Antibiotic Interpretation Microscan Method Status    Azithromycin Sensitive <=2 mcg/mL PATRICK Final    Clindamycin Sensitive <=0.25 mcg/mL PATRICK Final    Cefazolin Sensitive <=8 mcg/mL PATRICK Final    Cefepime Sensitive <=4 mcg/mL PATRICK Final    Daptomycin Sensitive <=0.5 mcg/mL PATRICK Final    Erythromycin Sensitive <=0.25 mcg/mL PATRICK Final    Ampicillin/sulbactam Sensitive <=8/4 mcg/mL PATRICK Final    Oxacillin Sensitive 0.5 mcg/mL PATRICK Final    Pip/Tazobactam Sensitive <=8 mcg/mL PATRICK Final    Trimeth/Sulfa Sensitive <=0.5/9.5 mcg/mL PATRICK Final    Tetracycline Sensitive <=4 mcg/mL PATRICK Final    Vancomycin Sensitive 1 mcg/mL PATRICK Final            Condensed View                   BLOOD CULTURE [[id number]] Collected: 06/22/21 1008    Order Status: Completed Specimen: Blood from Peripheral Updated: 06/23/21 0724     Significant Indicator NEG     Source BLD     Site PERIPHERAL     Culture Result No Growth  Note: Blood cultures are incubated for 5 days and  are monitored continuously.Positive blood cultures  are called to the RN and reported as soon as  they are identified.      Narrative:      Per Hospital Policy: Only change Specimen Src: to " "\"Line\" if  specified by physician order.  Right Forearm/Arm    BLOOD CULTURE [[id number]] Collected: 06/22/21 1008    Order Status: Completed Specimen: Blood from Peripheral Updated: 06/23/21 0724     Significant Indicator NEG     Source BLD     Site PERIPHERAL     Culture Result No Growth  Note: Blood cultures are incubated for 5 days and  are monitored continuously.Positive blood cultures  are called to the RN and reported as soon as  they are identified.      Narrative:      Per Hospital Policy: Only change Specimen Src: to \"Line\" if  specified by physician order.  Left Forearm/Arm    Blood Culture [[id number]] Collected: 06/21/21 0558    Order Status: Completed Specimen: Blood from Peripheral Updated: 06/22/21 0916     Significant Indicator NEG     Source BLD     Site PERIPHERAL     Culture Result No Growth  Note: Blood cultures are incubated for 5 days and  are monitored continuously.Positive blood cultures  are called to the RN and reported as soon as  they are identified.      Narrative:      From different peripheral sites, if not done within the last  24 hours (Per Hospital Policy: Only change specimen source to  \"Line\" if specified by physician order)  Left Hand    Urinalysis [[id number]]  (Abnormal) Collected: 06/21/21 0408    Order Status: Completed Specimen: Urine Updated: 06/21/21 0429     Color Yellow     Character Clear     Specific Gravity 1.019     Ph 5.0     Glucose Negative mg/dL      Ketones Negative mg/dL      Protein 100 mg/dL      Bilirubin Negative     Urobilinogen, Urine 0.2     Nitrite Negative     Leukocyte Esterase Negative     Occult Blood Trace     Micro Urine Req Microscopic    Narrative:      If not done within the last 24 hours    Blood Culture [[id number]] Collected: 06/21/21 0408    Order Status: Completed Specimen: Blood from Peripheral Updated: 06/21/21 0411    Narrative:      From different peripheral sites, if not done within the last  24 hours (Per Hospital Policy: Only change " "specimen source to  \"Line\" if specified by physician order)    COV-2, FLU A/B, AND RSV BY PCR (2-4 HOURS [facility]): Collect NP swab in [facility] [[id number]] Collected: 06/20/21 0100    Order Status: Completed Specimen: Respirate from Nasopharyngeal Updated: 06/21/21 0243     Influenza virus A RNA Negative     Influenza virus B, PCR Negative     RSV, PCR Negative     SARS-CoV-2 by PCR NotDetected     Comment: PATIENTS: Important information regarding your results and instructions can  be found at [email or web address]   \"After your  Covid-19 Test\"    [facility] providers: PLEASE REFER TO DE-ESCALATION AND RETESTING PROTOCOL  on [email or web address]    **The [employer] GeneXpert Xpress SARS-CoV-2 RT-PCR Test has been made  available for use under the Emergency Use Authorization (EUA) only.          SARS-CoV-2 Source NP Swab    Narrative:      Have you been in close contact with a person who is suspected  or known to be positive for COVID-19 within the last 30 days  (e.g. last seen that person < 30 days ago)->Unknown    Blood Culture [[id number]]     Order Status: Canceled Specimen: Blood from Peripheral     URINALYSIS,CULTURE IF INDICATED [[id number]] Collected: 06/20/21 0000    Order Status: Canceled Specimen: Urine           Assessment:  Active Hospital Problems    Diagnosis    • *Sepsis ([facility]) [A41.9]    • A-fib ([facility]) [I48.91]    • Type 2 myocardial infarction ([facility]) [I21.A1]    • Fall [W19.XXXA]    • Acute kidney injury ([facility]) [N17.9]    • Morbidly obese ([facility]) [E66.01]    • Cellulitis [L03.90]    • Acute respiratory failure ([facility]) [J96.00]    • Chronic thoracic aortic dissection ([facility]) [I71.01]        Plan:  Staph lugdunensis sepsis   Fever resolved  Persistent leukocytosis  ANGIE improved  Demand ischemia vs NSTEMI  Initial blood cultures showed multiple strains of Staph epidermidis, coag-negative staph but unfortunately showed Staph lugdunensis.  Cannot dismiss the finding of Staph lugdunensis as this organism   " is treated as Staphylococcus aureus  Bcxs 6/22 neg  TTE neg  Wife declines home  Continue Ancef while in the hosp  Orders for four weeks of IV aertapenem 1 gram therapy from the date of his negative blood cultures.   Stop date 7/19/2021    Left lower extremity cellulitis  Potential source.     Chronic thoracic aortic dissection.    Cardiothoracic surgery was consulted and no intervention is recommended.     Acute kidney injury.    Renal function has improved   Dose adjust abx as needed    Discussed with PCT Dr Adkins    ADDENDUM:  Advised by Dr Rapp that had diarrhea yesterday 5-6 times  Stool sent for Cdiff-negative

## 2021-06-25 NOTE — TELEPHONE ENCOUNTER
Patients wife wants to know if there is oral abx options. Pt wife is unable to manage home infusions due to current situation. There is an approval for home infusion via [facility] , except pt is unable to accommodate bag change and cannot transport him to hospital .

## 2021-06-26 LAB
BACTERIA BLD CULT: NORMAL
SIGNIFICANT IND 70042: NORMAL
SITE SITE: NORMAL
SOURCE SOURCE: NORMAL

## 2021-06-26 NOTE — PROGRESS NOTES
Midline dressing changed per [facility] request.    ANO 4. Patient given discharge instructions regarding follow up appointment, diet, activity, prescriptions, and when to call a provider. Patient demonstrated verbal understanding of information given. Transported in wheel chair off of the unit with all belongings.

## 2021-06-27 LAB
BACTERIA BLD CULT: NORMAL
BACTERIA BLD CULT: NORMAL
SIGNIFICANT IND 70042: NORMAL
SIGNIFICANT IND 70042: NORMAL
SITE SITE: NORMAL
SITE SITE: NORMAL
SOURCE SOURCE: NORMAL
SOURCE SOURCE: NORMAL

## 2021-06-27 NOTE — DISCHARGE SUMMARY
Discharge Summary    Date of Admission: 6/20/2021  Date of Discharge: 6/25/2021  5:50 PM  Discharging Attending: Dr. Laughlin  Discharging Senior Resident: Dr. Adkins  Discharging Intern: Dr. Rapp    CHIEF COMPLAINT ON ADMISSION  Chief Complaint   Patient presents with   • Fall     pt had fall going from kitchen to BR, landed forward on stomach onto tile floor. -head injury, -neck pain, pt states he was using walker and walker got too ahead of him.    • Atrial Fibrillation     upon fire arrival pt was in afib rvr, fire gave 324 ASA. ems stated pt still in afib rvr 120-150 w/ PVCs. pt has no hx of afib or heart issues.        Reason for Admission  Ground Level Fall  S. ludegensis bacteremia      Admission Date  6/20/2021    CODE STATUS  Prior    HPI & HOSPITAL COURSE  Mr. Lewis is a 80 year old male with history of gout and hypertension who  Presented initially for mechanical ground level fall with and found to have sepsis secondary too lower left foot cellulitis and as well as atrial fibrillation with rapid ventricular response accompanied by acute hypoxemic respiratory failure       Per patient, he was walking forward and as he was advancing is walker forward, he slid forward. He broke his fall by having his belly hit the ground first. He denies syncopal or presyncopal and palpitation episodes adrian-ground level fall. Patient noted his right toes began to bleed after the fall. Patient was then sent via ambulance to the emergency department    En route, patient was give 325 Aspirin and was found to be in atrial fibrillation with rapid ventricular response, patient also had a fever of 101.25 and was given tylenol.     At the emergency department, BP was 125/72; , T36.9; SpO2 95%. WBC ct 26.3; Hgb 14.0; plt 148; MCV 91.9, Na 138, K 5.4; Glucose 133. Crea 1.85. troponin was trended and eventually improved. Chest Xray showed enlarged heart and mediastinum. EKG did not show ischemic heart changes. Cardiology was  consulted at that time for possible MI. Unasyn and sepsis bolus was given for sepsis. Patient was then transferred to the medical floor for further management.     Patient also had CT chest for possible pulmonary embolism due to high d-dimer and found to be negative for pulmonary embolism. However, there was a old focal aortic dissection and cardiothoracic surgery was consulted. No recommendation for aggressive surgical intervention was recommended but recommended medical management and outpatient follow up.. Cardiology did cardiac stress test and was negative.     Patient's leukocytosis continued to improve on Unasyn. Patient's blood culture two fo two, had positive gram positive cocci and Unasyn was switched to cefazolin. Infectious disease was consulted at that time as a auto-consult. The speciation of the gram positive cocci was S. Lugdengenesis with S. epidermidis and ID recommended IV antibiotics for 4 weeks of 1 gram of IV ertapenem after the date of his negative blood cultures--stop date is 7/19/2021. C.diff was negative for patient's diarrhea.    At the time of discharge, patient was hemodynamically stable. He was advised to have infusion for his bacteremia. Patient was referred to .    The following medical issues during his hospital stay are addressed below:    Sepsis (HCC)- (present on admission)  #secondary S. ludegensis + S. epidermitidis bacteremia source likely left foot cellulitis.  Assessment & Plan  Resolved. Likely source is cellulitis of left foot     Plan  -4 weeks of IV antibiotics and patient will get it via infusion center. Stop date is 7/19/2021.     Chronic thoracic aortic dissection (HCC)  Assessment & Plan  Noted on CT-CTA at the superior lateral aortic arch  -Cardiovascular surgery consulted; do not recommend any acute interventions  -Follow-up as an outpatient upon discharge with cardiovascular surgery  -Blood pressure control, goals less than 130/80     Acute respiratory failure  ([facility])- (present on admission)  Assessment & Plan  Resolved. Requiring 2L nc on admission; CT-CTA showed no evidence of pulmonary embolism        Cellulitis- (present on admission)  Assessment & Plan  Improving. Cellulitis at LLE medial aspect to superior border of the malleolus  No obvious breakage of skin.     Started on unasyn and discontinued on 6/21; patient was then started on cefazolin but was switched to ertapemen for S. lugdengensis bacteremia.        Morbidly obese ([facility])- (present on admission)  Assessment & Plan  BMI 47       Acute kidney injury ([facility])- (present on admission)  Chronic kidney disease   Assessment & Plan  Resolved. Possibly acute on chronic kidney disease with current GFR of 35, BUN/creatinine 28/1.85     Plan  -discharging with home lisinopril  -discharging with carvediolol 6.25mg qdaily     Fall- (present on admission)  Assessment & Plan  Likely  mechanical without loss of consciousness or injuries    Type 2 myocardial infarction ([facility])- (present on admission)  Assessment & Plan  Resolved, likely demand ischemia secondary to atrial fibriliation with Rapid ventricular response. Stress test lexiscan negative for ischemia/Myocardial infarction.      A-fib ([facility])- (present on admission)    Assessment & Plan  Patient started on enoxapairn 150mg BID for CHADVASC of 3 and switched to apixaban 5mg BID.    Plan  - Discharging with carvedilol 6.25mg qdaily, and apixaban 5 mg BID.     Gout  Coronary arterial disease per CT chest   Stable. Not on diuretics    Plan  -discharging with home allopurinol   -Follow up with primary care       Therefore, he is discharged in fair and stable condition to home with close outpatient follow-up.    The patient met 2-midnight criteria for an inpatient stay at the time of discharge.    PHYSICAL EXAM ON DISCHARGE       Physical Exam    General: Obese man laying in bed in no acute distress  HEENT: NC/AT.  PERRLA, EOMI.  External ear normal.  Nares patent, nonedematous  nonerythematous.  Moist mucous membranes. Good dentition.  Trachea midline.   Neck: No JVP.  Carotid bruit could not be appreciated.  Nontender, nonnodular thyroid.  No anterior or posterior cervical, occipital, supraclavicular lymphadenopathy  Cardiovascular: PMI<2 cm at fifth costal space at midclavicular line.  No heaves appreciated.  No thrills.  Irregularly irregular tachycardia W/O M, R, G.  Pulmonary: Normal work of breathing.  Resonant to percussion.  CTAB, no wheezes, crackles, rhonchi heard in 10 lung fields  Abdomen: Obese, soft, nondistended.  Normoactive bowel sounds.  Nontender to percussion or palpation.  No hepatosplenomegaly noted.  No fluid wave  Musculoskeletal: Normal tone and bulk.  Hemosiderin deposits noted bilaterally.  Edematous, erythematous left lower extremity, nonpurulent.  Skin: Warm and dry.  No rashes, bruises or lacerations noted  Neuro: Alert and oriented X4.  CN II-XII checked and intact.  5 out of 5 strength throughout.  DTR 2+ throughout.  FTN, HTS intact.  Sensation intact . negative Romberg.  Lymphatic: No edema or lymphadenopathy noted.  Psychiatric: Good mood congruent affect.  Thought form linear, content appropriate     Discharge Date  6/25/2021    FOLLOW UP ITEMS POST DISCHARGE  Follow up chronic aortic dissection tear with vascular surgery  Follow up with infectious disease for side effect of ertapenem 4 week infusoin  Follow up with primary care for health care maintenance.     DISCHARGE DIAGNOSES  Principal Problem:    Sepsis (HCC) (Chronic) POA: Yes  Active Problems:    A-fib (HCC) POA: Yes    Type 2 myocardial infarction (HCC) POA: Yes    Fall POA: Yes    Acute kidney injury (HCC) POA: Yes    Morbidly obese (HCC) POA: Yes    Cellulitis POA: Yes    Acute respiratory failure (HCC) POA: Yes    Chronic thoracic aortic dissection (HCC) POA: Unknown  Resolved Problems:    * No resolved hospital problems. *      FOLLOW UP  Future Appointments   Date Time Provider Department  [place]   7/8/2021 10:00 AM Jamie Covarrubias M.D. [facility] None     Denver J Miller, M.D.  [address]  [place] [address]  [phone]    On 7/1/2021  appt is at 3 pm     Pcp Pt States None            MEDICATIONS ON DISCHARGE     Medication List      START taking these medications      Instructions   * apixaban 5mg Tabs  Commonly known as: ELIQUIS   Take 1 tablet by mouth 2 times a day. Indications: Thromboembolism secondary to Atrial Fibrillation  Dose: 5 mg     * apixaban 5mg Tabs  Commonly known as: ELIQUIS   Take 1 tablet by mouth 2 times a day.  Dose: 5 mg     carvedilol 6.25 MG Tabs  Commonly known as: COREG   Take 1 tablet by mouth 2 times a day with meals.  Dose: 6.25 mg         * This list has 2 medication(s) that are the same as other medications prescribed for you. Read the directions carefully, and ask your doctor or other care provider to review them with you.            CONTINUE taking these medications      Instructions   * allopurinol 100 MG Tabs  Commonly known as: ZYLOPRIM   Take 100 mg by mouth every morning. Takes 300 mg capsule and 100 mg capsule to equal 400 mg every morning  Dose: 100 mg     * allopurinol 300 MG Tabs  Commonly known as: ZYLOPRIM   Take 300 mg by mouth every morning. Takes 300 mg capsule and 100 mg capsule to equal 400 mg every morning  Dose: 300 mg     lisinopril 20 MG Tabs  Commonly known as: PRINIVIL   Take 20 mg by mouth 2 times a day.  Dose: 20 mg         * This list has 2 medication(s) that are the same as other medications prescribed for you. Read the directions carefully, and ask your doctor or other care provider to review them with you.            STOP taking these medications    metoprolol SR 25 MG Tb24  Commonly known as: TOPROL XL            Allergies  No Known Allergies    DIET  No orders of the defined types were placed in this encounter.      ACTIVITY  As tolerated.  Weight bearing as tolerated    CONSULTATIONS  Dr. Covarrubias with Cardiology consulted.   Treatment options were discussed and plan of care agreed upon.   Consulted vascular surgery per Dr. Bello.    PROCEDURES  Lexiscan on 6/23/2021 EF 60% without wall motion abnormalities; sinus rhythm, No ischemic changes with Regadenson. No evidence of significant jeopardized viable myocardium or prior myocardial infarction.

## 2021-06-30 ENCOUNTER — TELEPHONE (OUTPATIENT)
Dept: INFECTIOUS DISEASES | Facility: MEDICAL CENTER | Age: 81
End: 2021-06-30

## 2021-06-30 NOTE — TELEPHONE ENCOUNTER
Patient recommended speaking with his wife Yoselyn when she gets back from errands to make hosp fv. I informed the patient I would call after lunch today 06/30. Pt EOT 7/18

## 2021-07-07 ENCOUNTER — TELEPHONE (OUTPATIENT)
Dept: INFECTIOUS DISEASES | Facility: MEDICAL CENTER | Age: 81
End: 2021-07-07

## 2021-07-07 NOTE — TELEPHONE ENCOUNTER
Infusions  are being done at [facility] at the patients home. I will call them to request weekly labs for appointment with Norah PAZ 7/8/21    I called [facility] in regards to patients lab results, the caregiver seemed very unsure of my comments but will fax whatever she has for the patient.

## 2021-07-07 NOTE — PROGRESS NOTES
[facility]    Subjective:     Chief Complaint   Patient presents with   • Hospital Follow-up     Staph lugdunensis sepsis      This is my first time meeting Mr. Lewis.  Accompanied by his wife, Fabiola.    Interval History: 80-year-old male with a PMH of HTN.  Hospitalized from 6/20-6/25/21, admitted after ground-level fall that occurred on the day of admission.  Patient had been walking to his bathroom with his walker and stated that he had put it too far out in front of him and as a result he fell forward.  EMS was called and they noted that he had a fever of 101.2.  In the ED, he was noted to have significant tachycardia with heart rate of 140, elevated troponin, elevated lactic acidosis, and acute kidney injury with a creatinine of 1.8 with significant leukocytosis of 26,000.  EKG showed AFib with rapid ventricular response.  Blood cultures on 6/20 +MSSE, sensitive Staphylococcus lugdunensis and coag negative staph, repeat blood cultures on 6/21 and 6/22 were negative.  Discharged on IV Ancef 6 g continuous infusion x4 weeks, estimated end date 7/19/2021.     Hospital records reviewed    Today, 7/8/2021: Patient reports feeling well, about the same as when he left the hospital.  Has been tolerating the IV Cefazolin with minimal adverse effect.  Having some fatigue.  Was having some diarrhea, but it has now resolved.  Taking a probiotic.  Denies feeling generally ill, fevers/chills, general malaise, headache, n/v, abdominal pain, chest pain, shortness of breath, cough, sore throat or rash.  No issues with LUE midline.  Denies any pain.  Wife notes that pt's baseline WBC is in the 12s.  He has been evaluated by a Hematologist and they could not find a reason for the slight persistent elevation.  LLE cellulitis resolved, still has a small amount of swelling.          ROS as above in HPI.    Past Medical History:   Diagnosis Date   • Hypertension        Social History     Tobacco Use   • Smoking  "status: Never Smoker   • Smokeless tobacco: Never Used   Vaping Use   • Vaping Use: Never used   Substance Use Topics   • Alcohol use: Never   • Drug use: Never       Allergies: Patient has no known allergies.    Pt's medication and problem list reviewed.     Objective:     BP (!) 94/54 (BP Location: Right arm, Patient Position: Sitting, BP Cuff Size: Large adult)   Pulse 100   Temp 36.6 °C (97.8 °F) (Temporal)   Resp 16   Ht 1.803 m (5' 11\") Comment: patient reported  Wt (!) 155 kg (342 lb)   SpO2 98%   BMI 47.70 kg/m²     Physical Exam  Vitals reviewed.   Constitutional:       General: He is not in acute distress.     Appearance: Normal appearance. He is obese. He is not ill-appearing.   HENT:      Head: Normocephalic and atraumatic.      Right Ear: External ear normal.      Left Ear: External ear normal.   Eyes:      Extraocular Movements: Extraocular movements intact.      Conjunctiva/sclera: Conjunctivae normal.      Pupils: Pupils are equal, round, and reactive to light.   Cardiovascular:      Rate and Rhythm: Normal rate and regular rhythm.      Pulses: Normal pulses.      Heart sounds: Normal heart sounds. No murmur heard.     Pulmonary:      Effort: Pulmonary effort is normal. No respiratory distress.      Breath sounds: Normal breath sounds. No wheezing.   Abdominal:      General: Bowel sounds are normal. There is no distension.      Palpations: Abdomen is soft.      Tenderness: There is no abdominal tenderness.   Musculoskeletal:         General: No tenderness.      Cervical back: Normal range of motion and neck supple.      Left lower leg: Edema (+1) present.      Comments: LUE midline- CDI, non tender, no erythema.   Skin:     General: Skin is warm and dry.      Findings: No erythema or rash.   Neurological:      Mental Status: He is alert and oriented to person, place, and time. Mental status is at baseline.      Cranial Nerves: No cranial nerve deficit.      Sensory: No sensory deficit.      " Comments: Wheelchair   Psychiatric:         Mood and Affect: Mood normal.         Behavior: Behavior normal.         Thought Content: Thought content normal.         Judgment: Judgment normal.       Labs from 7/6/21:  WBC 13.8  Hgb 12.1  Hct 37.0  Plt 208    Glucose 97  BUN 21  Crt 1.46  Na 138  AST 24  ALT <5  Alk Phos 72    ESR 42    Assessment and Plan:   The following treatment plan was discussed with patient at length:    1. Bacteremia  Sed Rate    -Continue IV Cefazolin through 7/19/21.  -Repeat ESR on 7/13/21 to trend inflammatory markers.   -Contiue weekly CBC, CMP.    2. Polymicrobial bacterial infection: +MSSE, sensitive Staphylococcus lugdunensis      As above   3. Acute kidney injury (HCC)      CrCl~ 63, no need for renal adjustment to abx at this time   4. Chronic thoracic aortic dissection (HCC)      FU with cardiology as directed   5. Leukocytosis, unspecified type      Wife reports baseline WBC is in the 12s.  Has been evaluated by a Hematologist, could not find a reason for the slight persistent elevation. FU as directed.      Follow up: 10 days, RTC sooner if needed. FU with PCP for ongoing chronic medical conditions.     SYLVESTER Mann.       Please note that this dictation was created using voice recognition software. I have  worked with technical experts from [employer] to optimize the interface.  I have made every reasonable attempt to correct obvious errors, but there may be errors of grammar and possibly content that I did not discover before finalizing the note.

## 2021-07-08 ENCOUNTER — TELEPHONE (OUTPATIENT)
Dept: CARDIOLOGY | Facility: MEDICAL CENTER | Age: 81
End: 2021-07-08

## 2021-07-08 ENCOUNTER — OFFICE VISIT (OUTPATIENT)
Dept: CARDIOLOGY | Facility: MEDICAL CENTER | Age: 81
End: 2021-07-08
Payer: COMMERCIAL

## 2021-07-08 ENCOUNTER — OFFICE VISIT (OUTPATIENT)
Dept: INFECTIOUS DISEASES | Facility: MEDICAL CENTER | Age: 81
End: 2021-07-08
Payer: COMMERCIAL

## 2021-07-08 VITALS
TEMPERATURE: 97.8 F | HEART RATE: 100 BPM | OXYGEN SATURATION: 98 % | DIASTOLIC BLOOD PRESSURE: 54 MMHG | BODY MASS INDEX: 44.1 KG/M2 | WEIGHT: 315 LBS | RESPIRATION RATE: 16 BRPM | SYSTOLIC BLOOD PRESSURE: 94 MMHG | HEIGHT: 71 IN

## 2021-07-08 VITALS
WEIGHT: 315 LBS | BODY MASS INDEX: 44.1 KG/M2 | HEIGHT: 71 IN | OXYGEN SATURATION: 95 % | RESPIRATION RATE: 20 BRPM | SYSTOLIC BLOOD PRESSURE: 128 MMHG | HEART RATE: 92 BPM | DIASTOLIC BLOOD PRESSURE: 94 MMHG

## 2021-07-08 DIAGNOSIS — I10 ESSENTIAL HYPERTENSION, BENIGN: ICD-10-CM

## 2021-07-08 DIAGNOSIS — I48.0 PAROXYSMAL ATRIAL FIBRILLATION (HCC): ICD-10-CM

## 2021-07-08 DIAGNOSIS — R06.83 SNORING: ICD-10-CM

## 2021-07-08 DIAGNOSIS — R78.81 BACTEREMIA: ICD-10-CM

## 2021-07-08 DIAGNOSIS — E66.01 MORBID OBESITY WITH BMI OF 45.0-49.9, ADULT (HCC): ICD-10-CM

## 2021-07-08 DIAGNOSIS — I71.019 CHRONIC THORACIC AORTIC DISSECTION (HCC): ICD-10-CM

## 2021-07-08 DIAGNOSIS — D72.829 LEUKOCYTOSIS, UNSPECIFIED TYPE: ICD-10-CM

## 2021-07-08 DIAGNOSIS — N17.9 ACUTE KIDNEY INJURY (HCC): ICD-10-CM

## 2021-07-08 DIAGNOSIS — A49.9 POLYMICROBIAL BACTERIAL INFECTION: ICD-10-CM

## 2021-07-08 LAB — EKG IMPRESSION: NORMAL

## 2021-07-08 PROCEDURE — 99214 OFFICE O/P EST MOD 30 MIN: CPT | Performed by: INTERNAL MEDICINE

## 2021-07-08 PROCEDURE — 93000 ELECTROCARDIOGRAM COMPLETE: CPT | Performed by: INTERNAL MEDICINE

## 2021-07-08 PROCEDURE — 99214 OFFICE O/P EST MOD 30 MIN: CPT | Performed by: NURSE PRACTITIONER

## 2021-07-08 RX ORDER — CLONIDINE HYDROCHLORIDE 0.1 MG/1
TABLET ORAL
COMMUNITY
Start: 2021-07-01

## 2021-07-08 RX ORDER — FUROSEMIDE 20 MG/1
40 TABLET ORAL DAILY
COMMUNITY
Start: 2021-07-01

## 2021-07-08 RX ORDER — CEFAZOLIN SODIUM 10 G/1
INJECTION, POWDER, FOR SOLUTION INTRAVENOUS
COMMUNITY
Start: 2021-07-06

## 2021-07-08 ASSESSMENT — ENCOUNTER SYMPTOMS
WHEEZING: 0
SLEEP DISTURBANCES DUE TO BREATHING: 0
NUMBNESS: 0
DECREASED APPETITE: 0
ABDOMINAL PAIN: 0
WEAKNESS: 0
DIZZINESS: 0
VOMITING: 0
DIAPHORESIS: 0
HEADACHES: 0
IRREGULAR HEARTBEAT: 0
ORTHOPNEA: 0
NAUSEA: 0
SYNCOPE: 0
PARESTHESIAS: 0
BACK PAIN: 1
SHORTNESS OF BREATH: 0
PALPITATIONS: 0

## 2021-07-08 ASSESSMENT — FIBROSIS 4 INDEX
FIB4 SCORE: 5.73
FIB4 SCORE: 5.73

## 2021-07-08 NOTE — PROGRESS NOTES
Cardiology Follow-up Consultation Note    Date of note:    7/8/2021    Primary Care Provider: Denver J Miller, M.D.    Name:             David Lewis     YOB: 1940  MRN:               [id number]    Chief Complaint   Patient presents with   • Atrial Fibrillation   • Coronary Artery Disease   • Hypertension       HISTORY OF PRESENT ILLNESS  Mr. David Lewis is a 80 y.o. male who returns to see us for follow-up of Afib and HTN    I had the pleasure of seeing Mr. Lewis when he was admitted at [facility] from 6/20/2021-6/25/2021 for fall and was found to have new onset Afib with RVR and left foot cellulitis with MSSA bacteremia.  He self-converted to NSR in the hospital and was started on oral anticoagulation with eliquis.    Interim History:  States doing well since discharge.  Is on IV abx with ertapenem for MSSA bacteremia.    Was found to have elevated blood pressure at his PCPs office last week and was initiated on coreg and as needed clonidine for SBP>160.  Has a BP cuff at home but states that it is not accurate.    Was asymptomatic from Afib and has no knowledge of recurrent episodes at home.     Wife reports that he snores and stops breathing on occasion.  No prior evaluation for sleep apnea.    Review of Systems   Constitutional: Negative for decreased appetite, diaphoresis and malaise/fatigue.   Cardiovascular: Negative for chest pain, irregular heartbeat, leg swelling, orthopnea, palpitations and syncope.   Respiratory: Negative for shortness of breath, sleep disturbances due to breathing and wheezing.    Musculoskeletal: Positive for back pain and joint pain.   Gastrointestinal: Negative for abdominal pain, nausea and vomiting.   Neurological: Negative for dizziness, headaches, numbness, paresthesias and weakness.         Past Medical History:   Diagnosis Date   • Hypertension          History reviewed. No pertinent surgical history.      Current Outpatient Medications   Medication Sig  Dispense Refill   • ceFAZolin (ANCEF) 10 GM Recon Soln      • cloNIDine (CATAPRES) 0.1 MG Tab TAKE 1 TABLET BY MOUTH EVERY 6 HOURS AS NEEDED FOR BLOOD PRESSURE GREATER THAN 160/90     • furosemide (LASIX) 20 MG Tab Take 40 mg by mouth every day.     • Saccharomyces boulardii (FLORASTOR PO) Take  by mouth.     • carvedilol (COREG) 6.25 MG Tab Take 1 tablet by mouth 2 times a day with meals. 180 tablet 1   • apixaban (ELIQUIS) 5mg Tab Take 1 tablet by mouth 2 times a day. Indications: Thromboembolism secondary to Atrial Fibrillation 60 tablet 11   • allopurinol (ZYLOPRIM) 100 MG Tab Take 100 mg by mouth every morning. Takes 300 mg capsule and 100 mg capsule to equal 400 mg every morning     • allopurinol (ZYLOPRIM) 300 MG Tab Take 300 mg by mouth every morning. Takes 300 mg capsule and 100 mg capsule to equal 400 mg every morning     • lisinopril (PRINIVIL) 20 MG Tab Take 20 mg by mouth 2 times a day.       No current facility-administered medications for this visit.       No Known Allergies      History reviewed. No pertinent family history.      Social History     Socioeconomic History   • Marital status:      Spouse name: Not on file   • Number of children: Not on file   • Years of education: Not on file   • Highest education level: Not on file   Occupational History   • Not on file   Tobacco Use   • Smoking status: Never Smoker   • Smokeless tobacco: Never Used   Vaping Use   • Vaping Use: Never used   Substance and Sexual Activity   • Alcohol use: Never   • Drug use: Never   • Sexual activity: Not on file   Other Topics Concern   • Not on file   Social History Narrative   • Not on file     Social Determinants of Health     Financial Resource Strain:    • Difficulty of Paying Living Expenses:    Food Insecurity:    • Worried About Running Out of Food in the Last Year:    • Ran Out of Food in the Last Year:    Transportation Needs:    • Lack of Transportation (Medical):    • Lack of Transportation  "(Non-Medical):    Physical Activity:    • Days of Exercise per Week:    • Minutes of Exercise per Session:    Stress:    • Feeling of Stress :    Social Connections:    • Frequency of Communication with Friends and Family:    • Frequency of Social Gatherings with Friends and Family:    • Attends [employer] Services:    • Active Member of Clubs or Organizations:    • Attends Club or Organization Meetings:    • Marital Status:    Intimate Partner Violence:    • Fear of Current or Ex-Partner:    • Emotionally Abused:    • Physically Abused:    • Sexually Abused:          Physical Exam:  Ambulatory Vitals  /94 (BP Location: Left arm, Patient Position: Sitting, BP Cuff Size: Adult)   Pulse 92   Resp 20   Ht 1.803 m (5' 11\")   Wt (!) 156 kg (343 lb)   SpO2 95%    Oxygen Therapy:  Pulse Oximetry: 95 %  BP Readings from Last 4 Encounters:   07/08/21 128/94   06/25/21 147/91       Weight/BMI: Body mass index is 47.84 kg/m².  Wt Readings from Last 4 Encounters:   07/08/21 (!) 156 kg (343 lb)   06/25/21 (!) 152 kg (335 lb 5.1 oz)       GEN: Well developed, well nourished and in no acute distress.  HEART: no significant JVD, regular rate and rhythm, normal S1 and S2, no murmurs, no third heart sounds, normal cardiac palpation  LUNG: clear to auscultation bilaterally, no wheezing, no crackles, normal respiratory effort on room air  ABDOMEN: soft, non-tender, non-distended, normal bowel sounds throughout  EXTREMITIES: no peripheral edema noted  VASCULAR: no significantly elevated jugular venous pressure, no carotid bruits noted, radial pulses 2+ and equal      Lab Data Review:  Lab Results   Component Value Date/Time    CHOLSTRLTOT 116 06/22/2021 02:02 AM    LDL 61 06/22/2021 02:02 AM    HDL 38 (A) 06/22/2021 02:02 AM    TRIGLYCERIDE 85 06/22/2021 02:02 AM       Lab Results   Component Value Date/Time    SODIUM 136 06/25/2021 02:33 AM    POTASSIUM 4.5 06/25/2021 02:33 AM    CHLORIDE 104 06/25/2021 02:33 AM    CO2 22 " 06/25/2021 02:33 AM    GLUCOSE 116 (H) 06/25/2021 02:33 AM    BUN 21 06/25/2021 02:33 AM    CREATININE 1.62 (H) 06/25/2021 02:33 AM     Lab Results   Component Value Date/Time    ALKPHOSPHAT 61 06/23/2021 02:49 AM    ASTSGOT 44 06/23/2021 02:49 AM    ALTSGPT 12 06/23/2021 02:49 AM    TBILIRUBIN 0.4 06/23/2021 02:49 AM      Lab Results   Component Value Date/Time    WBC 14.3 (H) 06/25/2021 02:33 AM       Cardiac Imaging and Procedures Review:    EKG dated 7/8/2021: My personal interpretation is sinus rhythm with heart rate 89 bpm    Echo dated 6/22/2021:   CONCLUSIONS  Normal left ventricular systolic function.  Left ventricular ejection fraction is visually estimated to be 60-65%.  Normal right ventricular size and systolic function.  No significant valve abnormalities.   Right heart pressures are consistent with moderate pulmonary   hypertension.  No prior study is available for comparison.     Nuclear Perfusion Imaging (6/23/2021):    NUCLEAR IMAGING INTERPRETATION   No evidence of significant jeopardized viable myocardium or prior myocardial    infarction.   Normal left ventricular size, ejection fraction, and wall motion.   ECG INTERPRETATION   Negative stress ECG for ischemia.      Radiology test Review:  CXR: Reviewed, No acute cardiopulmonary abnormality noted.      Assessment & Plan     1. Paroxysmal atrial fibrillation (HCC)  EKG    OVERNIGHT PULSE OXIMETRY   2. Chronic thoracic aortic dissection (HCC)  REFERRAL TO CARDIOTHORACIC SURGERY   3. Morbid obesity with BMI of 45.0-49.9, adult (HCC)     4. Essential hypertension, benign  OVERNIGHT PULSE OXIMETRY   5. Snoring  OVERNIGHT PULSE OXIMETRY       ECG done in the clinic today shows NSR which has been d/w the patient and his wife.    Patient's [id number] score is 3, continue OAC with eliquis.  He understands the risks and benefits of chronic anticoagulation.  We reviewed and verified the results of annual testing for anemia and kidney function.    Obtain  overnight pulse oximeter to rule out sleep apnea.  If abnormal, referral to sleep medicine for sleep study and CPAP consideration which has been d/w the patient.    Blood pressure well controlled in the clinic today.  Continue lisinopril and coreg with as needed clonidine.    CT surgery referral provided for chronic thoracic aortic dissection.  Patient was seen in the hospital and was recommended to follow up as an outpatient.      All of patient and his wife's excellent questions were answered to the best of my knowledge and to their satisfaction.  It was a pleasure seeing Mr. David Lewis in my clinic today. Return in about 1 year (around 7/8/2022). Patient is aware to call the cardiology clinic with any questions or concerns.      Jamie Covarrubias MD  [facility]  [facility], [address]  [address] [address]  Phone: [phone]  Fax: [phone]

## 2021-07-08 NOTE — TELEPHONE ENCOUNTER
Faxed OPO order including cover sheet, face-sheet, chart notes, copy of pt's insurance card, and copy of pt's ID to [facility] [place] at fax # [phone]. Confirmation fax received and sent to [facility].

## 2021-07-19 ENCOUNTER — OFFICE VISIT (OUTPATIENT)
Dept: INFECTIOUS DISEASES | Facility: MEDICAL CENTER | Age: 81
End: 2021-07-19
Payer: COMMERCIAL

## 2021-07-19 VITALS
OXYGEN SATURATION: 93 % | BODY MASS INDEX: 44.1 KG/M2 | HEART RATE: 88 BPM | DIASTOLIC BLOOD PRESSURE: 80 MMHG | TEMPERATURE: 97.9 F | SYSTOLIC BLOOD PRESSURE: 118 MMHG | RESPIRATION RATE: 16 BRPM | WEIGHT: 315 LBS | HEIGHT: 71 IN

## 2021-07-19 DIAGNOSIS — A49.9 POLYMICROBIAL BACTERIAL INFECTION: ICD-10-CM

## 2021-07-19 DIAGNOSIS — N17.9 ACUTE KIDNEY INJURY (HCC): ICD-10-CM

## 2021-07-19 DIAGNOSIS — D72.829 LEUKOCYTOSIS, UNSPECIFIED TYPE: ICD-10-CM

## 2021-07-19 DIAGNOSIS — I71.019 CHRONIC THORACIC AORTIC DISSECTION (HCC): ICD-10-CM

## 2021-07-19 DIAGNOSIS — R78.81 BACTEREMIA: ICD-10-CM

## 2021-07-19 PROCEDURE — 99214 OFFICE O/P EST MOD 30 MIN: CPT | Performed by: NURSE PRACTITIONER

## 2021-07-19 ASSESSMENT — FIBROSIS 4 INDEX: FIB4 SCORE: 5.73

## 2021-07-19 NOTE — PROGRESS NOTES
Infectious Disease Clinic    Subjective:     Chief Complaint   Patient presents with   • Follow-Up     bacteremia     Interval History: 80-year-old male with a PMH of HTN.  Hospitalized from 6/20-6/25/21, admitted after ground-level fall that occurred on the day of admission.  Patient had been walking to his bathroom with his walker and stated that he had put it too far out in front of him and as a result he fell forward.  EMS was called and they noted that he had a fever of 101.2.  In the ED, he was noted to have significant tachycardia with heart rate of 140, elevated troponin, elevated lactic acidosis, and acute kidney injury with a creatinine of 1.8 with significant leukocytosis of 26,000.  EKG showed AFib with rapid ventricular response.  Blood cultures on 6/20 +MSSE, sensitive Staphylococcus lugdunensis and coag negative staph, repeat blood cultures on 6/21 and 6/22 were negative.  Discharged on IV Ancef 6 g continuous infusion x4 weeks, estimated end date 7/19/2021.     7/8/2021: Seen by JACKSON Burnett.  Wife notes that pt's baseline WBC is in the 12s.  He has been evaluated by a Hematologist and they could not find a reason for the slight persistent elevation.  LLE cellulitis resolved, still has a small amount of swelling.  Continue IV Cefazolin through 7/19/21.  Repeat ESR on 7/13/21 to trend inflammatory markers.    Today, 7/19/2021: Continues to take and tolerate the IV cefazolin without issue. Denies feeling generally ill, fevers/chills, general malaise, headache, n/v/d, abdominal pain, chest pain, shortness of breath, cough, sore throat or rash.  States that he feels the same overall, noting that he never really felt sick when he was going into the hospital or while he was hospitalized.  Denies issue to bilateral total knees.  Has a little bit of swelling to RLE, but denies any pain.  No issues with LLE where he had the cellulitis.    ROS as above in HPI.    Past Medical History:   Diagnosis Date  "  • Hypertension        Social History     Tobacco Use   • Smoking status: Never Smoker   • Smokeless tobacco: Never Used   Vaping Use   • Vaping Use: Never used   Substance Use Topics   • Alcohol use: Never   • Drug use: Never       Allergies: Patient has no known allergies.    Pt's medication and problem list reviewed.     Objective:     /80 (BP Location: Right arm, Patient Position: Sitting, BP Cuff Size: Large adult)   Pulse 88   Temp 36.6 °C (97.9 °F) (Temporal)   Resp 16   Ht 1.803 m (5' 11\")   Wt (!) 156 kg (343 lb)   SpO2 93%   BMI 47.84 kg/m²     Physical Exam  Vitals reviewed.   Constitutional:       General: He is not in acute distress.     Appearance: Normal appearance. He is obese. He is not ill-appearing or diaphoretic.   HENT:      Head: Normocephalic and atraumatic.      Right Ear: External ear normal.      Left Ear: External ear normal.   Eyes:      General: No scleral icterus.     Extraocular Movements: Extraocular movements intact.      Conjunctiva/sclera: Conjunctivae normal.      Pupils: Pupils are equal, round, and reactive to light.   Cardiovascular:      Rate and Rhythm: Normal rate and regular rhythm.      Pulses: Normal pulses.      Heart sounds: Normal heart sounds. No murmur heard.   No friction rub.   Pulmonary:      Effort: Pulmonary effort is normal. No respiratory distress.      Breath sounds: Normal breath sounds. No wheezing or rales.   Abdominal:      General: Bowel sounds are normal. There is no distension.      Palpations: Abdomen is soft.      Tenderness: There is no abdominal tenderness.   Musculoskeletal:         General: No tenderness.      Cervical back: Normal range of motion and neck supple. No tenderness.      Right lower leg: Edema (Trace to +1, localized around the foot) present.      Left lower leg: No edema.      Comments: LUE midline- CDI, non tender, no erythema.   Skin:     General: Skin is warm and dry.      Findings: No erythema or rash. "   Neurological:      Mental Status: He is alert and oriented to person, place, and time. Mental status is at baseline.      Cranial Nerves: No cranial nerve deficit.      Sensory: No sensory deficit.      Motor: Weakness present.      Comments: Wheelchair   Psychiatric:         Mood and Affect: Mood normal.         Behavior: Behavior normal.         Thought Content: Thought content normal.         Judgment: Judgment normal.       Labs from 7/14/21:  WBC 10.8  Hemoglobin 12.3  Hematocrit 38.5  Platelet 172    Glucose 111  BUN 13  Creatinine 1.34  Sodium 139  Potassium 4.5  AST 20  ALT <5  Alk phos 77    ESR 36    Assessment and Plan:   The following treatment plan was discussed with patient at length:    1. Bacteremia      -Stop IV cefazolin, completed a 4-week course of treatment without any s/sx of residual infection.  -No p.o. antibiotics to follow.  -Monitor for s/sx of worsening off abx: fevers, chills, general malaise, etc.  Notify ID or go to ER should these s/sx occur.   2. Polymicrobial bacterial infection: +MSSE, sensitive Staphylococcus lugdunensis      As above   3. Acute kidney injury (HCC)      CrCl~ 69.  No renal adjusment needed for abx.    4. Chronic thoracic aortic dissection (HCC)      Follow-up with cardiology as directed   5. Leukocytosis, unspecified type      Resolved on last check     Follow up: PRN, RTC sooner if needed. FU with PCP for ongoing chronic medical conditions.     SYLVESTER Mann.       Please note that this dictation was created using voice recognition software. I have  worked with technical experts from [facility]  [employer] to optimize the interface.  I have made every reasonable attempt to correct obvious errors, but there may be errors of grammar and possibly content that I did not discover before finalizing the note.

## 2021-08-02 ENCOUNTER — TELEPHONE (OUTPATIENT)
Dept: CARDIOLOGY | Facility: MEDICAL CENTER | Age: 81
End: 2021-08-02

## 2021-08-02 DIAGNOSIS — G47.34 NOCTURNAL HYPOXIA: ICD-10-CM

## 2021-08-02 NOTE — TELEPHONE ENCOUNTER
"Abnormal OPO results available, in media on 7/30. Per DA note on 7/8/21: \"Obtain overnight pulse oximeter to rule out sleep apnea.  If abnormal, referral to sleep medicine for sleep study and CPAP consideration which has been d/w the patient.\" "[employer]hart message sent to pt.   "

## 2021-08-10 ENCOUNTER — DOCUMENTATION (OUTPATIENT)
Dept: INFECTIOUS DISEASES | Facility: MEDICAL CENTER | Age: 81
End: 2021-08-10

## 2021-08-10 NOTE — PROGRESS NOTES
Spoke with patient on the phone regarding his ESR that showed 52 on 8/3.  Patient states that his PCP started him on a 10-day course of p.o. doxycycline 100 mg twice daily, and to be followed with once daily dosing of doxycycline for 30 days.  Patient states that he is not having any fevers, chills or feeling generally ill.  He did not feel any worse since completing the IV antibiotics.  He does not feel any different since being on the doxycycline.  Advised patient to stop taking the doxycycline as there is no proof of an active infection at this time.  Explained to patient that inflammatory markers can be elevated for issues other than infection, some of these conditions include, but are not limited to increase in age and obesity (patient is 156 kg).  Additionally, if patient was found to have a bloodstream infection, doxycycline would not be the appropriate treatment course.  Of note, doxycycline does have an anti-inflammatory component, would not be surprised if repeat inflammatory markers on doxycycline were lower, but this again does not mean that patient had an active infection that was being treated, especially as there is no proof of any form of active infection at this time.  Follow-up with infectious disease remains as needed.